# Patient Record
Sex: MALE | Race: WHITE | Employment: OTHER | URBAN - METROPOLITAN AREA
[De-identification: names, ages, dates, MRNs, and addresses within clinical notes are randomized per-mention and may not be internally consistent; named-entity substitution may affect disease eponyms.]

---

## 2017-01-05 ENCOUNTER — TELEPHONE (OUTPATIENT)
Dept: INTERNAL MEDICINE CLINIC | Age: 72
End: 2017-01-05

## 2017-01-11 ENCOUNTER — HOSPITAL ENCOUNTER (OUTPATIENT)
Dept: VASCULAR LAB | Age: 72
Discharge: HOME | End: 2017-01-11
Attending: INTERNAL MEDICINE | Admitting: INTERNAL MEDICINE

## 2017-01-11 DIAGNOSIS — I65.23 CAROTID STENOSIS, ASYMPTOMATIC, BILATERAL: Primary | ICD-10-CM

## 2017-01-11 DIAGNOSIS — E11.40 TYPE 2 DIABETES MELLITUS WITH DIABETIC NEUROPATHY (HCC): ICD-10-CM

## 2017-03-03 LAB — DIABETIC RETINOPATHY: NORMAL

## 2017-04-27 ENCOUNTER — OFFICE VISIT (OUTPATIENT)
Dept: INTERNAL MEDICINE CLINIC | Age: 72
End: 2017-04-27

## 2017-04-27 VITALS
WEIGHT: 196 LBS | DIASTOLIC BLOOD PRESSURE: 60 MMHG | HEART RATE: 72 BPM | BODY MASS INDEX: 28.06 KG/M2 | RESPIRATION RATE: 16 BRPM | HEIGHT: 70 IN | SYSTOLIC BLOOD PRESSURE: 120 MMHG

## 2017-04-27 DIAGNOSIS — E11.42 DIABETIC PERIPHERAL NEUROPATHY (HCC): ICD-10-CM

## 2017-04-27 DIAGNOSIS — E78.5 HYPERLIPIDEMIA, UNSPECIFIED HYPERLIPIDEMIA TYPE: ICD-10-CM

## 2017-04-27 DIAGNOSIS — E11.59 TYPE 2 DIABETES MELLITUS WITH VASCULAR DISEASE (HCC): ICD-10-CM

## 2017-04-27 DIAGNOSIS — I10 BENIGN ESSENTIAL HTN: ICD-10-CM

## 2017-04-27 DIAGNOSIS — Z23 NEED FOR TDAP VACCINATION: ICD-10-CM

## 2017-04-27 DIAGNOSIS — H11.31 SUBCONJUNCTIVAL HEMATOMA, RIGHT: ICD-10-CM

## 2017-04-27 DIAGNOSIS — I73.9 PVD (PERIPHERAL VASCULAR DISEASE) (HCC): ICD-10-CM

## 2017-04-27 DIAGNOSIS — I65.23 CAROTID STENOSIS, ASYMPTOMATIC, BILATERAL: ICD-10-CM

## 2017-04-27 PROCEDURE — G8598 ASA/ANTIPLAT THER USED: HCPCS | Performed by: INTERNAL MEDICINE

## 2017-04-27 PROCEDURE — G8427 DOCREV CUR MEDS BY ELIG CLIN: HCPCS | Performed by: INTERNAL MEDICINE

## 2017-04-27 PROCEDURE — 1123F ACP DISCUSS/DSCN MKR DOCD: CPT | Performed by: INTERNAL MEDICINE

## 2017-04-27 PROCEDURE — G8420 CALC BMI NORM PARAMETERS: HCPCS | Performed by: INTERNAL MEDICINE

## 2017-04-27 PROCEDURE — 1036F TOBACCO NON-USER: CPT | Performed by: INTERNAL MEDICINE

## 2017-04-27 PROCEDURE — 99214 OFFICE O/P EST MOD 30 MIN: CPT | Performed by: INTERNAL MEDICINE

## 2017-04-27 PROCEDURE — 3045F PR MOST RECENT HEMOGLOBIN A1C LEVEL 7.0-9.0%: CPT | Performed by: INTERNAL MEDICINE

## 2017-04-27 PROCEDURE — 4040F PNEUMOC VAC/ADMIN/RCVD: CPT | Performed by: INTERNAL MEDICINE

## 2017-04-27 PROCEDURE — 3017F COLORECTAL CA SCREEN DOC REV: CPT | Performed by: INTERNAL MEDICINE

## 2017-06-20 LAB
ALBUMIN SERPL-MCNC: 3.8 G/DL
ALP BLD-CCNC: 70 U/L
ALT SERPL-CCNC: 8 U/L
AST SERPL-CCNC: 13 U/L
BILIRUB SERPL-MCNC: 0.4 MG/DL (ref 0.1–1.4)
BUN BLDV-MCNC: 15 MG/DL
CALCIUM SERPL-MCNC: 9.9 MG/DL
CHLORIDE BLD-SCNC: 105 MMOL/L
CHOLESTEROL, TOTAL: 142 MG/DL
CHOLESTEROL/HDL RATIO: NORMAL
CO2: NORMAL MMOL/L
CREAT SERPL-MCNC: 0.82 MG/DL
GFR CALCULATED: NORMAL
GLUCOSE BLD-MCNC: NORMAL MG/DL
HDLC SERPL-MCNC: 54 MG/DL (ref 35–70)
LDL CHOLESTEROL CALCULATED: 70 MG/DL (ref 0–160)
POTASSIUM SERPL-SCNC: 4.5 MMOL/L
SODIUM BLD-SCNC: 143 MMOL/L
TOTAL PROTEIN: 6.2
TRIGL SERPL-MCNC: 88 MG/DL
VLDLC SERPL CALC-MCNC: 18 MG/DL

## 2017-06-22 RX ORDER — PIOGLITAZONEHYDROCHLORIDE 30 MG/1
TABLET ORAL
Qty: 90 TABLET | Refills: 3 | Status: SHIPPED | OUTPATIENT
Start: 2017-06-22 | End: 2018-07-20 | Stop reason: SDUPTHER

## 2017-07-10 RX ORDER — TAMSULOSIN HYDROCHLORIDE 0.4 MG/1
CAPSULE ORAL
Qty: 90 CAPSULE | Refills: 3 | Status: SHIPPED | OUTPATIENT
Start: 2017-07-10 | End: 2018-08-01 | Stop reason: SDUPTHER

## 2017-08-30 ENCOUNTER — OFFICE VISIT (OUTPATIENT)
Dept: INTERNAL MEDICINE CLINIC | Age: 72
End: 2017-08-30

## 2017-08-30 VITALS
HEIGHT: 68 IN | DIASTOLIC BLOOD PRESSURE: 68 MMHG | WEIGHT: 198 LBS | TEMPERATURE: 97 F | SYSTOLIC BLOOD PRESSURE: 112 MMHG | BODY MASS INDEX: 30.01 KG/M2

## 2017-08-30 DIAGNOSIS — E11.59 TYPE 2 DIABETES MELLITUS WITH VASCULAR DISEASE (HCC): ICD-10-CM

## 2017-08-30 DIAGNOSIS — I65.23 CAROTID STENOSIS, ASYMPTOMATIC, BILATERAL: ICD-10-CM

## 2017-08-30 DIAGNOSIS — I25.10 CAD IN NATIVE ARTERY: ICD-10-CM

## 2017-08-30 DIAGNOSIS — E11.42 DIABETIC PERIPHERAL NEUROPATHY (HCC): ICD-10-CM

## 2017-08-30 DIAGNOSIS — E78.5 HYPERLIPIDEMIA, UNSPECIFIED HYPERLIPIDEMIA TYPE: ICD-10-CM

## 2017-08-30 DIAGNOSIS — I10 BENIGN ESSENTIAL HTN: ICD-10-CM

## 2017-08-30 PROCEDURE — G8417 CALC BMI ABV UP PARAM F/U: HCPCS | Performed by: INTERNAL MEDICINE

## 2017-08-30 PROCEDURE — 4040F PNEUMOC VAC/ADMIN/RCVD: CPT | Performed by: INTERNAL MEDICINE

## 2017-08-30 PROCEDURE — 1123F ACP DISCUSS/DSCN MKR DOCD: CPT | Performed by: INTERNAL MEDICINE

## 2017-08-30 PROCEDURE — 3046F HEMOGLOBIN A1C LEVEL >9.0%: CPT | Performed by: INTERNAL MEDICINE

## 2017-08-30 PROCEDURE — 3017F COLORECTAL CA SCREEN DOC REV: CPT | Performed by: INTERNAL MEDICINE

## 2017-08-30 PROCEDURE — 1036F TOBACCO NON-USER: CPT | Performed by: INTERNAL MEDICINE

## 2017-08-30 PROCEDURE — G8427 DOCREV CUR MEDS BY ELIG CLIN: HCPCS | Performed by: INTERNAL MEDICINE

## 2017-08-30 PROCEDURE — G8598 ASA/ANTIPLAT THER USED: HCPCS | Performed by: INTERNAL MEDICINE

## 2017-08-30 PROCEDURE — 99214 OFFICE O/P EST MOD 30 MIN: CPT | Performed by: INTERNAL MEDICINE

## 2017-09-06 ENCOUNTER — HOSPITAL ENCOUNTER (OUTPATIENT)
Dept: VASCULAR LAB | Age: 72
Discharge: HOME | End: 2017-09-06
Attending: INTERNAL MEDICINE | Admitting: INTERNAL MEDICINE

## 2017-09-06 DIAGNOSIS — I65.23 OCCLUSION AND STENOSIS OF BILATERAL CAROTID ARTERIES: ICD-10-CM

## 2018-01-03 ENCOUNTER — OFFICE VISIT (OUTPATIENT)
Dept: INTERNAL MEDICINE CLINIC | Age: 73
End: 2018-01-03

## 2018-01-03 VITALS
DIASTOLIC BLOOD PRESSURE: 50 MMHG | SYSTOLIC BLOOD PRESSURE: 120 MMHG | BODY MASS INDEX: 28.49 KG/M2 | HEIGHT: 70 IN | WEIGHT: 199 LBS | HEART RATE: 68 BPM | RESPIRATION RATE: 12 BRPM

## 2018-01-03 DIAGNOSIS — I25.10 CAD IN NATIVE ARTERY: ICD-10-CM

## 2018-01-03 DIAGNOSIS — Z23 NEED FOR PNEUMOCOCCAL VACCINATION: ICD-10-CM

## 2018-01-03 DIAGNOSIS — I10 BENIGN ESSENTIAL HTN: ICD-10-CM

## 2018-01-03 DIAGNOSIS — E11.40 TYPE 2 DIABETES, CONTROLLED, WITH NEUROPATHY (HCC): Primary | ICD-10-CM

## 2018-01-03 DIAGNOSIS — E11.42 DIABETIC PERIPHERAL NEUROPATHY (HCC): ICD-10-CM

## 2018-01-03 DIAGNOSIS — I73.9 PVD (PERIPHERAL VASCULAR DISEASE) (HCC): ICD-10-CM

## 2018-01-03 DIAGNOSIS — Z12.5 SCREENING FOR PROSTATE CANCER: ICD-10-CM

## 2018-01-03 DIAGNOSIS — E78.5 HYPERLIPIDEMIA, UNSPECIFIED HYPERLIPIDEMIA TYPE: ICD-10-CM

## 2018-01-03 DIAGNOSIS — E11.59 TYPE 2 DIABETES MELLITUS WITH VASCULAR DISEASE (HCC): ICD-10-CM

## 2018-01-03 PROCEDURE — G8427 DOCREV CUR MEDS BY ELIG CLIN: HCPCS | Performed by: INTERNAL MEDICINE

## 2018-01-03 PROCEDURE — G8484 FLU IMMUNIZE NO ADMIN: HCPCS | Performed by: INTERNAL MEDICINE

## 2018-01-03 PROCEDURE — 1123F ACP DISCUSS/DSCN MKR DOCD: CPT | Performed by: INTERNAL MEDICINE

## 2018-01-03 PROCEDURE — 90732 PPSV23 VACC 2 YRS+ SUBQ/IM: CPT | Performed by: INTERNAL MEDICINE

## 2018-01-03 PROCEDURE — 4040F PNEUMOC VAC/ADMIN/RCVD: CPT | Performed by: INTERNAL MEDICINE

## 2018-01-03 PROCEDURE — G8417 CALC BMI ABV UP PARAM F/U: HCPCS | Performed by: INTERNAL MEDICINE

## 2018-01-03 PROCEDURE — G0009 ADMIN PNEUMOCOCCAL VACCINE: HCPCS | Performed by: INTERNAL MEDICINE

## 2018-01-03 PROCEDURE — 3046F HEMOGLOBIN A1C LEVEL >9.0%: CPT | Performed by: INTERNAL MEDICINE

## 2018-01-03 PROCEDURE — G8598 ASA/ANTIPLAT THER USED: HCPCS | Performed by: INTERNAL MEDICINE

## 2018-01-03 PROCEDURE — 3017F COLORECTAL CA SCREEN DOC REV: CPT | Performed by: INTERNAL MEDICINE

## 2018-01-03 PROCEDURE — 1036F TOBACCO NON-USER: CPT | Performed by: INTERNAL MEDICINE

## 2018-01-03 PROCEDURE — 99214 OFFICE O/P EST MOD 30 MIN: CPT | Performed by: INTERNAL MEDICINE

## 2018-01-03 ASSESSMENT — PATIENT HEALTH QUESTIONNAIRE - PHQ9
SUM OF ALL RESPONSES TO PHQ9 QUESTIONS 1 & 2: 0
1. LITTLE INTEREST OR PLEASURE IN DOING THINGS: 0
SUM OF ALL RESPONSES TO PHQ QUESTIONS 1-9: 0
2. FEELING DOWN, DEPRESSED OR HOPELESS: 0

## 2018-01-03 NOTE — PATIENT INSTRUCTIONS
Patient Education        Pneumococcal Polysaccharide Vaccine: What You Need to Know  Why get vaccinated? Vaccination can protect older adults (and some children and younger adults) from pneumococcal disease. Pneumococcal disease is caused by bacteria that can spread from person to person through close contact. It can cause ear infections, and it can also lead to more serious infections of the:  · Lungs (pneumonia),  · Blood (bacteremia), and  · Covering of the brain and spinal cord (meningitis). Meningitis can cause deafness and brain damage, and it can be fatal.  Anyone can get pneumococcal disease, but children under 3years of age, people with certain medical conditions, adults over 72years of age, and cigarette smokers are at the highest risk. About 18,000 older adults die each year from pneumococcal disease in the [place]. Treatment of pneumococcal infections with penicillin and other drugs used to be more effective. But some strains of the disease have become resistant to these drugs. This makes prevention of the disease, through vaccination, even more important. Pneumococcal polysaccharide vaccine (PPSV23)  Pneumococcal polysaccharide vaccine (PPSV23) protects against 23 types of pneumococcal bacteria. It will not prevent all pneumococcal disease. PPSV23 is recommended for:  · All adults 72years of age and older,  · Anyone 2 through 59years of age with certain long-term health problems,  · Anyone 2 through 59years of age with a weakened immune system,  · Adults 23 through 59years of age who smoke cigarettes or have asthma. Most people need only one dose of PPSV. A second dose is recommended for certain high-risk groups. People 72 and older should get a dose even if they have gotten one or more doses of the vaccine before they turned 65. Your healthcare provider can give you more information about these recommendations.   Most healthy adults develop protection within 2 to 3 weeks of getting information. Patient Education        Diabetic Neuropathy: Care Instructions  Your Care Instructions    When you have diabetes, your blood sugar level may get too high. Over time, high blood sugar levels can damage nerves. This is called diabetic neuropathy. Nerve damage can cause pain, burning, tingling, and numbness and may leave you feeling weak. The feet are often affected. When you have nerve damage in your feet, you cannot feel your feet and toes as well as normal and may not notice cuts or sores. Even a small injury can lead to a serious infection. It is very important that you follow your doctor's advice on foot care. Sometimes diabetes damages nerves that help the body function. If this happens, your blood pressure, sweating, digestion, and urination might be affected. Your doctor may give you a target blood sugar level that is higher or lower than you are used to. Try to keep your blood sugar very close to this target level to prevent more damage. Follow-up care is a key part of your treatment and safety. Be sure to make and go to all appointments, and call your doctor if you are having problems. It's also a good idea to know your test results and keep a list of the medicines you take. How can you care for yourself at home? · Take your medicines exactly as prescribed. Call your doctor if you think you are having a problem with your medicine. It is very important that you take your insulin or diabetes pills as your doctor tells you. · Try to keep blood sugar at your target level. ¨ Eat a variety of healthy foods, with carbohydrate spread out in your meals. A dietitian can help you plan meals. ¨ Try to get at least 30 minutes of exercise on most days. ¨ Check your blood sugar as many times each day as your doctor recommends. · Take and record your blood pressure at home if your doctor tells you to. Learn the importance of the two measures of blood pressure (such as 130 over 80, or 130/80).  To take your blood pressure at home:  ¨ Ask your doctor to check your blood pressure monitor to be sure it is accurate and the cuff fits you. Also ask your doctor to watch you to make sure that you are using it right. ¨ Do not use medicine known to raise blood pressure (such as some nasal decongestant sprays) before taking your blood pressure. ¨ Avoid taking your blood pressure if you have just exercised or are nervous or upset. Rest at least 15 minutes before you take a reading. · Take pain medicines exactly as directed. ¨ If the doctor gave you a prescription medicine for pain, take it as prescribed. ¨ If you are not taking a prescription pain medicine, ask your doctor if you can take an over-the-counter medicine. · Do not smoke. Smoking can increase your chance for a heart attack or stroke. If you need help quitting, talk to your doctor about stop-smoking programs and medicines. These can increase your chances of quitting for good. · Limit alcohol to 2 drinks a day for men and 1 drink a day for women. Too much alcohol can cause health problems. · Eat small meals often, rather than 2 or 3 large meals a day. To care for your feet  · Prevent injury by wearing shoes at all times, even when you are indoors. · Do foot care as part of your daily routine. Wash your feet and then rub lotion on your feet, but not between your toes. Use a handheld mirror or magnifying mirror to inspect your feet for blisters, cuts, cracks, or sores. · Have your toenails trimmed and filed straight across. · Wear shoes and socks that fit well. Soft shoes that have good support and that fit well (such as tennis shoes) are best for your feet. · Check your shoes for any loose objects or rough edges before you put them on. · Ask your doctor to check your feet during each visit. Your doctor may notice a foot problem you have missed. · Get early treatment for any foot problem, even a minor one. When should you call for help?   Call your doctor now or seek immediate medical care if:  ? · You have symptoms of infection, such as:  ¨ Increased pain, swelling, warmth, or redness. ¨ Red streaks leading from the area. ¨ Pus draining from the area. ¨ A fever. ? · You have new or worse numbness, pain, or tingling in any part of your body. ? Watch closely for changes in your health, and be sure to contact your doctor if:  ? · You have a new problem with your feet, such as:  ¨ A new sore or ulcer. ¨ A break in the skin that is not healing after several days. ¨ Bleeding corns or calluses. ¨ An ingrown toenail. ? · You do not get better as expected. Where can you learn more? Go to https://OneRiot.[employer]. org and sign in to your [employer] account. Enter F833 in the Sansan box to learn more about \"Diabetic Neuropathy: Care Instructions. \"     If you do not have an account, please click on the \"Sign Up Now\" link. Current as of: March 13, 2017  Content Version: 11.4  © 0647-8415 [employer]. Care instructions adapted under license by [facility] ([facility]). If you have questions about a medical condition or this instruction, always ask your healthcare professional. [address] any warranty or liability for your use of this information. Patient Education        Diabetes Foot Health: Care Instructions  Your Care Instructions    When you have diabetes, your feet need extra care and attention. Diabetes can damage the nerve endings and blood vessels in your feet, making you less likely to notice when your feet are injured. Diabetes also limits your body's ability to fight infection and get blood to areas that need it. If you get a minor foot injury, it could become an ulcer or a serious infection. With good foot care, you can prevent most of these problems. Caring for your feet can be quick and easy. Most of the care can be done when you are bathing or getting ready for bed.   Follow-up care is a catalogs. · Look inside your shoes every day for things like gravel or torn linings, which could cause blisters or sores. · Buy shoes that fit well:  ¨ Look for shoes that have plenty of space around the toes. This helps prevent bunions and blisters. ¨ Try on shoes while wearing the kind of socks you will usually wear with the shoes. ¨ Avoid plastic shoes. They may rub your feet and cause blisters. Good shoes should be made of materials that are flexible and breathable, such as leather or cloth. ¨ Break in new shoes slowly by wearing them for no more than an hour a day for several days. Take extra time to check your feet for red areas, blisters, or other problems after you wear new shoes. · Do not go barefoot. Do not wear sandals, and do not wear shoes with very thin soles. Thin soles are easy to puncture. They also do not protect your feet from hot pavement or cold weather. · Have your doctor check your feet during each visit. If you have a foot problem, see your doctor. Do not try to treat an early foot problem at home. Home remedies or treatments that you can buy without a prescription (such as corn removers) can be harmful. · Always get early treatment for foot problems. A minor irritation can lead to a major problem if not properly cared for early. When should you call for help? Call your doctor now or seek immediate medical care if:  ? · You have a foot sore, an ulcer or break in the skin that is not healing after 4 days, bleeding corns or calluses, or an ingrown toenail. ? · You have blue or black areas, which can mean bruising or blood flow problems. ? · You have peeling skin or tiny blisters between your toes or cracking or oozing of the skin. ? · You have a fever for more than 24 hours and a foot sore. ? · You have new numbness or tingling in your feet that does not go away after you move your feet or change positions. ? · You have unexplained or unusual swelling of the foot or ankle. ?Watch closely for changes in your health, and be sure to contact your doctor if:  ? · You cannot do proper foot care. Where can you learn more? Go to https://chpepiceweb.[email or web address]. org and sign in to your [employer] account. Enter A739 in the Fanattac box to learn more about \"Diabetes Foot Health: Care Instructions. \"     If you do not have an account, please click on the \"Sign Up Now\" link. Current as of: March 13, 2017  Content Version: 11.4  © 6179-1184 [employer]. Care instructions adapted under license by [facility] ([facility]). If you have questions about a medical condition or this instruction, always ask your healthcare professional. [address] any warranty or liability for your use of this information.

## 2018-01-08 RX ORDER — LISINOPRIL 20 MG/1
TABLET ORAL
Qty: 90 TABLET | Refills: 3 | Status: SHIPPED | OUTPATIENT
Start: 2018-01-08 | End: 2019-04-01 | Stop reason: SDUPTHER

## 2018-04-25 LAB
ALBUMIN SERPL-MCNC: 4 G/DL
ALP BLD-CCNC: 68 U/L
ALT SERPL-CCNC: 15 U/L
ANION GAP SERPL CALCULATED.3IONS-SCNC: NORMAL MMOL/L
AST SERPL-CCNC: 25 U/L
AVERAGE GLUCOSE: NORMAL
BILIRUB SERPL-MCNC: 0.5 MG/DL (ref 0.1–1.4)
BUN BLDV-MCNC: 12 MG/DL
CALCIUM SERPL-MCNC: 10.2 MG/DL
CHLORIDE BLD-SCNC: 101 MMOL/L
CHOLESTEROL, TOTAL: 143 MG/DL
CHOLESTEROL/HDL RATIO: NORMAL
CO2: 26 MMOL/L
CREAT SERPL-MCNC: 0.76 MG/DL
GFR CALCULATED: NORMAL
GLUCOSE BLD-MCNC: 134 MG/DL
HBA1C MFR BLD: 7 %
HDLC SERPL-MCNC: 58 MG/DL (ref 35–70)
LDL CHOLESTEROL CALCULATED: 70 MG/DL (ref 0–160)
POTASSIUM SERPL-SCNC: 4.5 MMOL/L
PROSTATE SPECIFIC ANTIGEN FREE: NORMAL NG/ML
PROSTATE SPECIFIC ANTIGEN PERCENT FREE: NORMAL %
PSA-PROSTATE SPECIFIC AG: 0.4
SODIUM BLD-SCNC: 138 MMOL/L
TOTAL PROTEIN: 6.7
TRIGL SERPL-MCNC: 74 MG/DL
VLDLC SERPL CALC-MCNC: 15 MG/DL

## 2018-05-09 ENCOUNTER — OFFICE VISIT (OUTPATIENT)
Dept: INTERNAL MEDICINE CLINIC | Age: 73
End: 2018-05-09

## 2018-05-09 VITALS
HEART RATE: 68 BPM | DIASTOLIC BLOOD PRESSURE: 50 MMHG | SYSTOLIC BLOOD PRESSURE: 115 MMHG | RESPIRATION RATE: 12 BRPM | BODY MASS INDEX: 28.63 KG/M2 | HEIGHT: 70 IN | WEIGHT: 200 LBS

## 2018-05-09 DIAGNOSIS — E11.40 TYPE 2 DIABETES, CONTROLLED, WITH NEUROPATHY (HCC): ICD-10-CM

## 2018-05-09 DIAGNOSIS — I25.10 CAD IN NATIVE ARTERY: ICD-10-CM

## 2018-05-09 DIAGNOSIS — E11.42 DIABETIC PERIPHERAL NEUROPATHY (HCC): ICD-10-CM

## 2018-05-09 DIAGNOSIS — I65.23 ASYMPTOMATIC BILATERAL CAROTID ARTERY STENOSIS: ICD-10-CM

## 2018-05-09 DIAGNOSIS — I73.9 PVD (PERIPHERAL VASCULAR DISEASE) (HCC): ICD-10-CM

## 2018-05-09 DIAGNOSIS — Z23 NEED FOR SHINGLES VACCINE: ICD-10-CM

## 2018-05-09 DIAGNOSIS — E11.59 TYPE 2 DIABETES MELLITUS WITH VASCULAR DISEASE (HCC): ICD-10-CM

## 2018-05-09 DIAGNOSIS — I10 BENIGN ESSENTIAL HTN: ICD-10-CM

## 2018-05-09 DIAGNOSIS — E78.5 HYPERLIPIDEMIA, UNSPECIFIED HYPERLIPIDEMIA TYPE: ICD-10-CM

## 2018-05-09 PROCEDURE — 3017F COLORECTAL CA SCREEN DOC REV: CPT | Performed by: INTERNAL MEDICINE

## 2018-05-09 PROCEDURE — 1036F TOBACCO NON-USER: CPT | Performed by: INTERNAL MEDICINE

## 2018-05-09 PROCEDURE — G8598 ASA/ANTIPLAT THER USED: HCPCS | Performed by: INTERNAL MEDICINE

## 2018-05-09 PROCEDURE — G8428 CUR MEDS NOT DOCUMENT: HCPCS | Performed by: INTERNAL MEDICINE

## 2018-05-09 PROCEDURE — 1123F ACP DISCUSS/DSCN MKR DOCD: CPT | Performed by: INTERNAL MEDICINE

## 2018-05-09 PROCEDURE — 3045F PR MOST RECENT HEMOGLOBIN A1C LEVEL 7.0-9.0%: CPT | Performed by: INTERNAL MEDICINE

## 2018-05-09 PROCEDURE — 2022F DILAT RTA XM EVC RTNOPTHY: CPT | Performed by: INTERNAL MEDICINE

## 2018-05-09 PROCEDURE — G8417 CALC BMI ABV UP PARAM F/U: HCPCS | Performed by: INTERNAL MEDICINE

## 2018-05-09 PROCEDURE — 4040F PNEUMOC VAC/ADMIN/RCVD: CPT | Performed by: INTERNAL MEDICINE

## 2018-05-09 PROCEDURE — 99214 OFFICE O/P EST MOD 30 MIN: CPT | Performed by: INTERNAL MEDICINE

## 2018-07-20 RX ORDER — PIOGLITAZONEHYDROCHLORIDE 30 MG/1
TABLET ORAL
Qty: 90 TABLET | Refills: 3 | Status: SHIPPED | OUTPATIENT
Start: 2018-07-20 | End: 2019-07-10 | Stop reason: SDUPTHER

## 2018-07-31 ENCOUNTER — NURSE ONLY (OUTPATIENT)
Dept: INTERNAL MEDICINE CLINIC | Age: 73
End: 2018-07-31

## 2018-07-31 DIAGNOSIS — Z12.12 SCREENING FOR MALIGNANT NEOPLASM OF THE RECTUM: Primary | ICD-10-CM

## 2018-08-01 LAB
CONTROL: NORMAL
HEMOCCULT STL QL: NORMAL

## 2018-08-01 PROCEDURE — 82274 ASSAY TEST FOR BLOOD FECAL: CPT | Performed by: INTERNAL MEDICINE

## 2018-08-01 RX ORDER — TAMSULOSIN HYDROCHLORIDE 0.4 MG/1
CAPSULE ORAL
Qty: 90 CAPSULE | Refills: 3 | Status: SHIPPED | OUTPATIENT
Start: 2018-08-01 | End: 2019-07-28 | Stop reason: SDUPTHER

## 2018-09-26 ENCOUNTER — OFFICE VISIT (OUTPATIENT)
Dept: INTERNAL MEDICINE CLINIC | Age: 73
End: 2018-09-26
Payer: MEDICARE

## 2018-09-26 VITALS
HEART RATE: 64 BPM | HEIGHT: 68 IN | SYSTOLIC BLOOD PRESSURE: 115 MMHG | WEIGHT: 201 LBS | DIASTOLIC BLOOD PRESSURE: 46 MMHG | BODY MASS INDEX: 30.46 KG/M2 | RESPIRATION RATE: 16 BRPM

## 2018-09-26 DIAGNOSIS — I73.9 PVD (PERIPHERAL VASCULAR DISEASE) (HCC): ICD-10-CM

## 2018-09-26 DIAGNOSIS — E11.59 TYPE 2 DIABETES MELLITUS WITH VASCULAR DISEASE (HCC): ICD-10-CM

## 2018-09-26 DIAGNOSIS — I25.10 CAD IN NATIVE ARTERY: ICD-10-CM

## 2018-09-26 DIAGNOSIS — I10 BENIGN ESSENTIAL HTN: ICD-10-CM

## 2018-09-26 DIAGNOSIS — E11.42 DIABETIC PERIPHERAL NEUROPATHY (HCC): ICD-10-CM

## 2018-09-26 DIAGNOSIS — E78.5 HYPERLIPIDEMIA, UNSPECIFIED HYPERLIPIDEMIA TYPE: ICD-10-CM

## 2018-09-26 DIAGNOSIS — E11.40 TYPE 2 DIABETES, CONTROLLED, WITH NEUROPATHY (HCC): ICD-10-CM

## 2018-09-26 DIAGNOSIS — Z23 NEED FOR SHINGLES VACCINE: ICD-10-CM

## 2018-09-26 LAB
A/G RATIO: 1.3 (ref 1.1–2.2)
ALBUMIN SERPL-MCNC: 4 G/DL (ref 3.4–5)
ALP BLD-CCNC: 87 U/L (ref 40–129)
ALT SERPL-CCNC: 11 U/L (ref 10–40)
ANION GAP SERPL CALCULATED.3IONS-SCNC: 12 MMOL/L (ref 3–16)
AST SERPL-CCNC: 16 U/L (ref 15–37)
BILIRUB SERPL-MCNC: 0.3 MG/DL (ref 0–1)
BUN BLDV-MCNC: 14 MG/DL (ref 7–20)
CALCIUM SERPL-MCNC: 10.1 MG/DL (ref 8.3–10.6)
CHLORIDE BLD-SCNC: 102 MMOL/L (ref 99–110)
CHOLESTEROL, TOTAL: 147 MG/DL (ref 0–199)
CO2: 25 MMOL/L (ref 21–32)
CREAT SERPL-MCNC: 0.6 MG/DL (ref 0.8–1.3)
ESTIMATED AVERAGE GLUCOSE: 165.7 MG/DL
GFR AFRICAN AMERICAN: >60
GFR NON-AFRICAN AMERICAN: >60
GLOBULIN: 3 G/DL
GLUCOSE BLD-MCNC: 180 MG/DL (ref 70–99)
HBA1C MFR BLD: 7.4 %
HDLC SERPL-MCNC: 60 MG/DL (ref 40–60)
LDL CHOLESTEROL CALCULATED: 73 MG/DL
POTASSIUM SERPL-SCNC: 4.7 MMOL/L (ref 3.5–5.1)
SODIUM BLD-SCNC: 139 MMOL/L (ref 136–145)
TOTAL PROTEIN: 7 G/DL (ref 6.4–8.2)
TRIGL SERPL-MCNC: 70 MG/DL (ref 0–150)
TSH SERPL DL<=0.05 MIU/L-ACNC: 3.29 UIU/ML (ref 0.27–4.2)
VLDLC SERPL CALC-MCNC: 14 MG/DL

## 2018-09-26 PROCEDURE — 1036F TOBACCO NON-USER: CPT | Performed by: INTERNAL MEDICINE

## 2018-09-26 PROCEDURE — G8598 ASA/ANTIPLAT THER USED: HCPCS | Performed by: INTERNAL MEDICINE

## 2018-09-26 PROCEDURE — 3045F PR MOST RECENT HEMOGLOBIN A1C LEVEL 7.0-9.0%: CPT | Performed by: INTERNAL MEDICINE

## 2018-09-26 PROCEDURE — 4040F PNEUMOC VAC/ADMIN/RCVD: CPT | Performed by: INTERNAL MEDICINE

## 2018-09-26 PROCEDURE — 99214 OFFICE O/P EST MOD 30 MIN: CPT | Performed by: INTERNAL MEDICINE

## 2018-09-26 PROCEDURE — G8417 CALC BMI ABV UP PARAM F/U: HCPCS | Performed by: INTERNAL MEDICINE

## 2018-09-26 PROCEDURE — G8428 CUR MEDS NOT DOCUMENT: HCPCS | Performed by: INTERNAL MEDICINE

## 2018-09-26 PROCEDURE — 1101F PT FALLS ASSESS-DOCD LE1/YR: CPT | Performed by: INTERNAL MEDICINE

## 2018-09-26 PROCEDURE — 1123F ACP DISCUSS/DSCN MKR DOCD: CPT | Performed by: INTERNAL MEDICINE

## 2018-09-26 PROCEDURE — 3017F COLORECTAL CA SCREEN DOC REV: CPT | Performed by: INTERNAL MEDICINE

## 2018-09-26 PROCEDURE — 2022F DILAT RTA XM EVC RTNOPTHY: CPT | Performed by: INTERNAL MEDICINE

## 2018-09-26 NOTE — PROGRESS NOTES
[address] Internal Medicine  Patient Encounter  Dr. Christiano Garcia    Chief Complaint   Patient presents with   William Qureshi       HPI: 68 y.o. male seen today regarding the status of his current chronic medical problems below and medication review. Diabetes Mellitus Type II, Follow-up--   Lab Results   Component Value Date    LABA1C 7.0 04/25/2018     Nothing has changed: he does not check his sugars and he does not adhere to lifestyle changes. He does not adhere to carb restricted diet. He eats a lot of sweets. He denies new dysesthesias in the feet. He does walk 2-3 times per week. Last Eye Exam-- 3/3/2017, Overdue  U.Microalbumin/Cr-- 11/30/2017  Pt is on ACEI --Lisinopril. +ASA 81 mg daily   Complications--  Neuropathy, PVD. No tobacco use  LDL--70  + Statin    HTN-- He denies concerns with regards to his BP. No checks at home. He is compliant with his medication regimen includes Lisinopril 20 mg daily, Lopressor 25 mg twice a day. He denies dizziness, swelling, syncope, HA's. He endorses no symptoms suggestive of TIA or stroke. Hyperlipidemia:    Lab Results   Component Value Date    LDLCALC 70 04/25/2018     He continues on simvastatin 40 mg nightly. He denies new myalgias or weakness. He does not follow a low fat diet. 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 Angiogram 4/29/2013. He sees Dr. Ebony Ferrell. He was just seen in June. Note reviewed. He denies CP at rest or with exertion. He denies ARMENTA, swelling, orthopnea. Carotid stenosis-- Previous history---->  Last doppler 9/6/2017: <50% BL. He is asymptomatic. PVD--  He denies any claudication with exertion or at rest.  This was seen on imaging. No skin changes on feet.        Past Medical History:   Diagnosis Date    CAD in native artery     Carotid stenoses 4/29/2013    20-49% BL    Cataracts, bilateral     Hyperlipidemia     Hypertension     PVD (peripheral vascular disease) ([facility] [address]     Type II or unspecified type diabetes mellitus without mention of complication, not stated as uncontrolled        Review of Systems   As per HPI      OBJECTIVE:  BP (!) 115/46   Pulse 64   Resp 16   Ht 5' 8\" (1.727 m)   Wt 201 lb (91.2 kg)   BMI 30.56 kg/m²     Wt Readings from Last 3 Encounters:   09/26/18 201 lb (91.2 kg)   05/09/18 200 lb (90.7 kg)   01/03/18 199 lb (90.3 kg)     BP Readings from Last 3 Encounters:   09/26/18 (!) 115/46   05/09/18 (!) 115/50   01/03/18 (!) 120/50       GEN: NAD, A&O, Overweight. HEENT:  NC/AT, CYN, EOMI, TM's NL, Anicteric. Oral cavity is clear without mucosal lesions. NECK: Supple  No thyromegaly. No soft tissue masses. No JVD. Trachea midline. LYMPH: No C/SC/A nodes  CV: Rhythm is regular. Rate normal.  No ectopy. No murmurs. PULM: CTA  EXT: No pitting edema  GI: Abd. Soft, NT, No masses. No hepatomegaly. NEURO:  No focal or lateralizing deficits. Monofilament testing intact both feet. Vibratory sensation suprisingly intact  SKIN:  No concerning rashes  VASC: No carotid bruits. Pedal pulses are palpable. ASSESSMENT/PLAN:    1. Type 2 diabetes, uncontrolled, with neuropathy (HCC)  Condition stability is uncertain. Due for lab  Patient has lab orders in the computer  Counseled patient on the need to participate in lifestyle modification. Patient has really been resistant to engaging in lifestyle modifications. He eats what he wants and dissipates and very little exercise  Diabetic retinal eye exam is overdue. Patient encouraged to get this done  Counseled patient again on regular foot exams  Will check urinalysis next visit to monitor for hematuria while on pioglitazone    2. Type 2 diabetes mellitus with vascular disease ([address]  As above    3. Need for shingles vaccine    - zoster recombinant adjuvanted vaccine (SHINGRIX) 50 MCG SUSR injection; Inject 0.5 mLs into the muscle once for 1 dose . Repeat in 2-6 months  Dispense: 0.5 mL; Refill: 1    4.  Benign essential HTN  Blood pressure is well controlled  Continue current medications    5. Hyperlipidemia, unspecified hyperlipidemia type  Condition stability is uncertain. Overdue for lab  Continue simvastatin which she has tolerated well  Observe for the development of adverse side effects. 6. PVD (peripheral vascular disease) ([address]  Condition is stable without signs of claudication  Continue aggressive cardiovascular risk factor management    7. Diabetic peripheral neuropathy (HCC)  Condition is stable without signs of worsening dysesthesias. Actually he has improved sensation  Continue tight diabetic control    8. CAD in native artery  Addition is stable.   He has no signs of cardiac decompensation or fluid overload  Continue Actos with caution  Continue aggressive cardiovascular risk factor management

## 2018-09-28 ENCOUNTER — TELEPHONE (OUTPATIENT)
Dept: INTERNAL MEDICINE CLINIC | Age: 73
End: 2018-09-28

## 2018-09-28 NOTE — TELEPHONE ENCOUNTER
Patient returned call regarding blood test results. Please have Lauren Montemayor call him to discuss results at [facility]. Thanks.

## 2019-01-23 ENCOUNTER — OFFICE VISIT (OUTPATIENT)
Dept: INTERNAL MEDICINE CLINIC | Age: 74
End: 2019-01-23
Payer: MEDICARE

## 2019-01-23 VITALS
HEIGHT: 70 IN | HEART RATE: 58 BPM | WEIGHT: 203 LBS | DIASTOLIC BLOOD PRESSURE: 60 MMHG | BODY MASS INDEX: 29.06 KG/M2 | SYSTOLIC BLOOD PRESSURE: 120 MMHG | RESPIRATION RATE: 12 BRPM

## 2019-01-23 DIAGNOSIS — E11.42 DIABETIC PERIPHERAL NEUROPATHY (HCC): ICD-10-CM

## 2019-01-23 DIAGNOSIS — I65.23 ASYMPTOMATIC BILATERAL CAROTID ARTERY STENOSIS: ICD-10-CM

## 2019-01-23 DIAGNOSIS — I10 BENIGN ESSENTIAL HTN: ICD-10-CM

## 2019-01-23 DIAGNOSIS — E78.5 HYPERLIPIDEMIA, UNSPECIFIED HYPERLIPIDEMIA TYPE: ICD-10-CM

## 2019-01-23 DIAGNOSIS — Z79.899 DRUG THERAPY: ICD-10-CM

## 2019-01-23 DIAGNOSIS — I25.10 CAD IN NATIVE ARTERY: ICD-10-CM

## 2019-01-23 DIAGNOSIS — I73.9 PVD (PERIPHERAL VASCULAR DISEASE) (HCC): ICD-10-CM

## 2019-01-23 DIAGNOSIS — E11.59 TYPE 2 DIABETES MELLITUS WITH VASCULAR DISEASE (HCC): ICD-10-CM

## 2019-01-23 LAB
BILIRUBIN, POC: NORMAL
BLOOD URINE, POC: NORMAL
CLARITY, POC: CLEAR
COLOR, POC: YELLOW
CREATININE URINE: 107.3 MG/DL (ref 39–259)
GLUCOSE URINE, POC: NORMAL
KETONES, POC: NORMAL
LEUKOCYTE EST, POC: NORMAL
MICROALBUMIN UR-MCNC: <1.2 MG/DL
MICROALBUMIN/CREAT UR-RTO: NORMAL MG/G (ref 0–30)
NITRITE, POC: NORMAL
PH, POC: 5
PROTEIN, POC: NORMAL
SPECIFIC GRAVITY, POC: 1.01
UROBILINOGEN, POC: 0.2

## 2019-01-23 PROCEDURE — 1123F ACP DISCUSS/DSCN MKR DOCD: CPT | Performed by: INTERNAL MEDICINE

## 2019-01-23 PROCEDURE — 99214 OFFICE O/P EST MOD 30 MIN: CPT | Performed by: INTERNAL MEDICINE

## 2019-01-23 PROCEDURE — 1101F PT FALLS ASSESS-DOCD LE1/YR: CPT | Performed by: INTERNAL MEDICINE

## 2019-01-23 PROCEDURE — 81002 URINALYSIS NONAUTO W/O SCOPE: CPT | Performed by: INTERNAL MEDICINE

## 2019-01-23 PROCEDURE — G8598 ASA/ANTIPLAT THER USED: HCPCS | Performed by: INTERNAL MEDICINE

## 2019-01-23 PROCEDURE — 2022F DILAT RTA XM EVC RTNOPTHY: CPT | Performed by: INTERNAL MEDICINE

## 2019-01-23 PROCEDURE — G8427 DOCREV CUR MEDS BY ELIG CLIN: HCPCS | Performed by: INTERNAL MEDICINE

## 2019-01-23 PROCEDURE — G8484 FLU IMMUNIZE NO ADMIN: HCPCS | Performed by: INTERNAL MEDICINE

## 2019-01-23 PROCEDURE — 3046F HEMOGLOBIN A1C LEVEL >9.0%: CPT | Performed by: INTERNAL MEDICINE

## 2019-01-23 PROCEDURE — G8417 CALC BMI ABV UP PARAM F/U: HCPCS | Performed by: INTERNAL MEDICINE

## 2019-01-23 PROCEDURE — 1036F TOBACCO NON-USER: CPT | Performed by: INTERNAL MEDICINE

## 2019-01-23 PROCEDURE — 3017F COLORECTAL CA SCREEN DOC REV: CPT | Performed by: INTERNAL MEDICINE

## 2019-01-23 PROCEDURE — 4040F PNEUMOC VAC/ADMIN/RCVD: CPT | Performed by: INTERNAL MEDICINE

## 2019-03-11 ENCOUNTER — OFFICE VISIT (OUTPATIENT)
Dept: ORTHOPEDIC SURGERY | Age: 74
End: 2019-03-11
Payer: MEDICARE

## 2019-03-11 VITALS
HEIGHT: 68 IN | WEIGHT: 200 LBS | BODY MASS INDEX: 30.31 KG/M2 | SYSTOLIC BLOOD PRESSURE: 130 MMHG | HEART RATE: 76 BPM | DIASTOLIC BLOOD PRESSURE: 74 MMHG

## 2019-03-11 DIAGNOSIS — M79.672 LEFT FOOT PAIN: Primary | ICD-10-CM

## 2019-03-11 DIAGNOSIS — M10.9 GOUTY ARTHRITIS OF LEFT GREAT TOE: ICD-10-CM

## 2019-03-11 PROCEDURE — 4040F PNEUMOC VAC/ADMIN/RCVD: CPT | Performed by: PHYSICIAN ASSISTANT

## 2019-03-11 PROCEDURE — 3017F COLORECTAL CA SCREEN DOC REV: CPT | Performed by: PHYSICIAN ASSISTANT

## 2019-03-11 PROCEDURE — G8417 CALC BMI ABV UP PARAM F/U: HCPCS | Performed by: PHYSICIAN ASSISTANT

## 2019-03-11 PROCEDURE — 1101F PT FALLS ASSESS-DOCD LE1/YR: CPT | Performed by: PHYSICIAN ASSISTANT

## 2019-03-11 PROCEDURE — 99203 OFFICE O/P NEW LOW 30 MIN: CPT | Performed by: PHYSICIAN ASSISTANT

## 2019-03-11 PROCEDURE — 1123F ACP DISCUSS/DSCN MKR DOCD: CPT | Performed by: PHYSICIAN ASSISTANT

## 2019-03-11 PROCEDURE — G8484 FLU IMMUNIZE NO ADMIN: HCPCS | Performed by: PHYSICIAN ASSISTANT

## 2019-03-11 PROCEDURE — G8598 ASA/ANTIPLAT THER USED: HCPCS | Performed by: PHYSICIAN ASSISTANT

## 2019-03-11 PROCEDURE — 1036F TOBACCO NON-USER: CPT | Performed by: PHYSICIAN ASSISTANT

## 2019-03-11 PROCEDURE — G8427 DOCREV CUR MEDS BY ELIG CLIN: HCPCS | Performed by: PHYSICIAN ASSISTANT

## 2019-03-11 RX ORDER — HYDROCODONE BITARTRATE AND ACETAMINOPHEN 5; 325 MG/1; MG/1
1-2 TABLET ORAL EVERY 6 HOURS PRN
Qty: 20 TABLET | Refills: 0 | Status: SHIPPED | OUTPATIENT
Start: 2019-03-11 | End: 2019-03-18

## 2019-03-11 RX ORDER — INDOMETHACIN 50 MG/1
50 CAPSULE ORAL 2 TIMES DAILY WITH MEALS
Qty: 20 CAPSULE | Refills: 0 | Status: SHIPPED | OUTPATIENT
Start: 2019-03-11 | End: 2019-05-01 | Stop reason: DRUGHIGH

## 2019-04-01 RX ORDER — LISINOPRIL 20 MG/1
TABLET ORAL
Qty: 90 TABLET | Refills: 3 | Status: SHIPPED | OUTPATIENT
Start: 2019-04-01 | End: 2020-01-16 | Stop reason: SDUPTHER

## 2019-04-02 ENCOUNTER — HOSPITAL ENCOUNTER (OUTPATIENT)
Age: 74
Discharge: HOME | End: 2019-04-02
Payer: MEDICARE

## 2019-04-02 DIAGNOSIS — I25.10 CAD IN NATIVE ARTERY: ICD-10-CM

## 2019-04-02 DIAGNOSIS — I10 BENIGN ESSENTIAL HTN: ICD-10-CM

## 2019-04-02 DIAGNOSIS — I73.9 PVD (PERIPHERAL VASCULAR DISEASE) (HCC): ICD-10-CM

## 2019-04-02 DIAGNOSIS — E78.5 HYPERLIPIDEMIA, UNSPECIFIED HYPERLIPIDEMIA TYPE: ICD-10-CM

## 2019-04-02 DIAGNOSIS — E11.59 TYPE 2 DIABETES MELLITUS WITH VASCULAR DISEASE (HCC): ICD-10-CM

## 2019-04-02 LAB
A/G RATIO: 1.3 (ref 1.1–2.2)
ALBUMIN SERPL-MCNC: 3.9 G/DL (ref 3.4–5)
ALP BLD-CCNC: 67 U/L (ref 40–129)
ALT SERPL-CCNC: 10 U/L (ref 10–40)
ANION GAP SERPL CALCULATED.3IONS-SCNC: 12 MMOL/L (ref 3–16)
AST SERPL-CCNC: 17 U/L (ref 15–37)
BILIRUB SERPL-MCNC: 0.4 MG/DL (ref 0–1)
BUN BLDV-MCNC: 15 MG/DL (ref 7–20)
CALCIUM SERPL-MCNC: 9.9 MG/DL (ref 8.3–10.6)
CHLORIDE BLD-SCNC: 103 MMOL/L (ref 99–110)
CHOLESTEROL, TOTAL: 128 MG/DL (ref 0–199)
CO2: 24 MMOL/L (ref 21–32)
CREAT SERPL-MCNC: 1 MG/DL (ref 0.8–1.3)
GFR AFRICAN AMERICAN: >60
GFR NON-AFRICAN AMERICAN: >60
GLOBULIN: 3.1 G/DL
GLUCOSE BLD-MCNC: 126 MG/DL (ref 70–99)
HDLC SERPL-MCNC: 58 MG/DL (ref 40–60)
LDL CHOLESTEROL CALCULATED: 56 MG/DL
POTASSIUM SERPL-SCNC: 4.4 MMOL/L (ref 3.5–5.1)
SODIUM BLD-SCNC: 139 MMOL/L (ref 136–145)
TOTAL PROTEIN: 7 G/DL (ref 6.4–8.2)
TRIGL SERPL-MCNC: 71 MG/DL (ref 0–150)
VLDLC SERPL CALC-MCNC: 14 MG/DL

## 2019-04-02 PROCEDURE — 80061 LIPID PANEL: CPT

## 2019-04-02 PROCEDURE — 80053 COMPREHEN METABOLIC PANEL: CPT

## 2019-04-02 PROCEDURE — 83036 HEMOGLOBIN GLYCOSYLATED A1C: CPT

## 2019-04-02 PROCEDURE — 36415 COLL VENOUS BLD VENIPUNCTURE: CPT

## 2019-04-03 LAB
ESTIMATED AVERAGE GLUCOSE: 159.9 MG/DL
HBA1C MFR BLD: 7.2 %

## 2019-05-01 ENCOUNTER — OFFICE VISIT (OUTPATIENT)
Dept: INTERNAL MEDICINE CLINIC | Age: 74
End: 2019-05-01
Payer: MEDICARE

## 2019-05-01 VITALS
RESPIRATION RATE: 12 BRPM | SYSTOLIC BLOOD PRESSURE: 120 MMHG | BODY MASS INDEX: 29.86 KG/M2 | DIASTOLIC BLOOD PRESSURE: 50 MMHG | WEIGHT: 197 LBS | HEIGHT: 68 IN | HEART RATE: 58 BPM

## 2019-05-01 DIAGNOSIS — R60.0 BILATERAL LEG EDEMA: ICD-10-CM

## 2019-05-01 DIAGNOSIS — I87.2 VENOUS INSUFFICIENCY OF BOTH LOWER EXTREMITIES: ICD-10-CM

## 2019-05-01 DIAGNOSIS — E11.59 TYPE 2 DIABETES MELLITUS WITH VASCULAR DISEASE (HCC): ICD-10-CM

## 2019-05-01 DIAGNOSIS — I10 BENIGN ESSENTIAL HTN: ICD-10-CM

## 2019-05-01 DIAGNOSIS — E11.42 DIABETIC PERIPHERAL NEUROPATHY (HCC): ICD-10-CM

## 2019-05-01 DIAGNOSIS — Z12.5 SCREENING FOR PROSTATE CANCER: ICD-10-CM

## 2019-05-01 DIAGNOSIS — I25.10 CAD IN NATIVE ARTERY: ICD-10-CM

## 2019-05-01 DIAGNOSIS — E78.5 HYPERLIPIDEMIA, UNSPECIFIED HYPERLIPIDEMIA TYPE: ICD-10-CM

## 2019-05-01 DIAGNOSIS — M10.9 GOUTY ARTHRITIS OF LEFT GREAT TOE: ICD-10-CM

## 2019-05-01 DIAGNOSIS — I65.23 ASYMPTOMATIC BILATERAL CAROTID ARTERY STENOSIS: ICD-10-CM

## 2019-05-01 DIAGNOSIS — I73.9 PVD (PERIPHERAL VASCULAR DISEASE) (HCC): ICD-10-CM

## 2019-05-01 PROCEDURE — G8417 CALC BMI ABV UP PARAM F/U: HCPCS | Performed by: INTERNAL MEDICINE

## 2019-05-01 PROCEDURE — G8598 ASA/ANTIPLAT THER USED: HCPCS | Performed by: INTERNAL MEDICINE

## 2019-05-01 PROCEDURE — 1123F ACP DISCUSS/DSCN MKR DOCD: CPT | Performed by: INTERNAL MEDICINE

## 2019-05-01 PROCEDURE — 3045F PR MOST RECENT HEMOGLOBIN A1C LEVEL 7.0-9.0%: CPT | Performed by: INTERNAL MEDICINE

## 2019-05-01 PROCEDURE — G8427 DOCREV CUR MEDS BY ELIG CLIN: HCPCS | Performed by: INTERNAL MEDICINE

## 2019-05-01 PROCEDURE — 4040F PNEUMOC VAC/ADMIN/RCVD: CPT | Performed by: INTERNAL MEDICINE

## 2019-05-01 PROCEDURE — 1036F TOBACCO NON-USER: CPT | Performed by: INTERNAL MEDICINE

## 2019-05-01 PROCEDURE — 2022F DILAT RTA XM EVC RTNOPTHY: CPT | Performed by: INTERNAL MEDICINE

## 2019-05-01 PROCEDURE — 3017F COLORECTAL CA SCREEN DOC REV: CPT | Performed by: INTERNAL MEDICINE

## 2019-05-01 PROCEDURE — 99214 OFFICE O/P EST MOD 30 MIN: CPT | Performed by: INTERNAL MEDICINE

## 2019-05-01 RX ORDER — INDOMETHACIN 50 MG/1
50 CAPSULE ORAL
Qty: 30 CAPSULE | Refills: 0 | Status: SHIPPED | OUTPATIENT
Start: 2019-05-01 | End: 2020-01-15 | Stop reason: ALTCHOICE

## 2019-05-01 ASSESSMENT — PATIENT HEALTH QUESTIONNAIRE - PHQ9
1. LITTLE INTEREST OR PLEASURE IN DOING THINGS: 0
SUM OF ALL RESPONSES TO PHQ QUESTIONS 1-9: 0
SUM OF ALL RESPONSES TO PHQ QUESTIONS 1-9: 0
SUM OF ALL RESPONSES TO PHQ9 QUESTIONS 1 & 2: 0
2. FEELING DOWN, DEPRESSED OR HOPELESS: 0

## 2019-05-01 NOTE — PATIENT INSTRUCTIONS
Patient Education        Carotid Stenosis: Care Instructions  Your Care Instructions    Carotid stenosis is narrowing of one or both of the carotid arteries. These arteries take blood from the heart to the brain. There is one on each side of the neck. A substance called plaque builds up inside an artery. This makes it too narrow. Plaque comes from damage to the artery over time. This damage may be caused by high blood pressure, high cholesterol, diabetes, or smoking. Sometimes plaque can break loose from the carotid artery and move to the brain. This can cause a stroke or transient ischemic attack (TIA). The goal of treatment is to lower your risk of having a stroke or TIA. You can lower your risk by making healthy lifestyle changes and taking medicine. Sometimes a surgery or procedure is done. Follow-up care is a key part of your treatment and safety. Be sure to make and go to all appointments, and call your doctor if you are having problems. It's also a good idea to know your test results and keep a list of the medicines you take. How can you care for yourself at home? · Take your medicines exactly as prescribed. Call your doctor if you think you are having a problem with your medicine. You may take medicine to lower your blood pressure, to lower your cholesterol, or to prevent blood clots. · If you take a blood thinner, such as aspirin, be sure to get instructions about how to take your medicine safely. Blood thinners can cause serious bleeding problems. · Do not smoke. People who smoke have a higher chance of stroke than those who quit. If you need help quitting, talk to your doctor about stop-smoking programs and medicines. These can increase your chances of quitting for good. · Eat a healthy diet that is low in saturated fat and salt. Eat lots of fresh fruits and vegetables and foods high in fiber. · Stay at a healthy weight. Lose weight if you need to.   · Talk to your doctor about starting an exercise program. Regular exercise lowers your chance of stroke. · Limit alcohol to 2 drinks a day for men and 1 drink a day for women. Too much alcohol can cause health problems. · Work with your doctor to control high blood pressure, high cholesterol, diabetes, and other conditions that increase your chance of a stroke. A healthy diet, exercise, weight loss (if needed), and medicines can help. · Avoid colds and flu. Get the flu vaccine every year. When should you call for help? Call 911 anytime you think you may need emergency care. For example, call if:    · You passed out (lost consciousness).     · You have symptoms of a stroke. These may include:  ? Sudden numbness, tingling, weakness, or loss of movement in your face, arm, or leg, especially on only one side of your body. ? Sudden vision changes. ? Sudden trouble speaking. ? Sudden confusion or trouble understanding simple statements. ? Sudden problems with walking or balance. ? A sudden, severe headache that is different from past headaches.    Call your doctor now or seek immediate medical care if:    · You are dizzy or lightheaded, or you feel like you may faint.    Watch closely for changes in your health, and be sure to contact your doctor if you have any problems. Where can you learn more? Go to https://Solaria.Oktagon Games. org and sign in to your GTRAN account. Enter F300 in the Plink Search box to learn more about \"Carotid Stenosis: Care Instructions. \"     If you do not have an account, please click on the \"Sign Up Now\" link. Current as of: July 22, 2018  Content Version: 11.9  © 2058-0879 Zapya, Incorporated. Care instructions adapted under license by [facility] ([facility]). If you have questions about a medical condition or this instruction, always ask your healthcare professional. [place] [address] any warranty or liability for your use of this information.          Patient Education        Learning you have a balanced meal. It also helps you spread carbs throughout the day. ? Divide your plate by types of foods. Put non-starchy vegetables on half the plate, meat or other protein food on one-quarter of the plate, and a grain or starchy vegetable in the final quarter of the plate. To this you can add a small piece of fruit and 1 cup of milk or yogurt, depending on how many carbs you are supposed to eat at a meal.  · Try to eat about the same amount of carbs at each meal. Do not \"save up\" your daily allowance of carbs to eat at one meal.  · Proteins have very little or no carbs per serving. Examples of proteins are beef, chicken, turkey, fish, eggs, tofu, cheese, cottage cheese, and peanut butter. A serving size of meat is 3 ounces, which is about the size of a deck of cards. Examples of meat substitute serving sizes (equal to 1 ounce of meat) are 1/4 cup of cottage cheese, 1 egg, 1 tablespoon of peanut butter, and ½ cup of tofu. How can you eat out and still eat healthy? · Learn to estimate the serving sizes of foods that have carbohydrate. If you measure food at home, it will be easier to estimate the amount in a serving of restaurant food. · If the meal you order has too much carbohydrate (such as potatoes, corn, or baked beans), ask to have a low-carbohydrate food instead. Ask for a salad or green vegetables. · If you use insulin, check your blood sugar before and after eating out to help you plan how much to eat in the future. · If you eat more carbohydrate at a meal than you had planned, take a walk or do other exercise. This will help lower your blood sugar. What else should you know? · Limit saturated fat, such as the fat from meat and dairy products. This is a healthy choice because people who have diabetes are at higher risk of heart disease. So choose lean cuts of meat and nonfat or low-fat dairy products. Use olive or canola oil instead of butter or shortening when cooking.   · Don't skip meals. Your blood sugar may drop too low if you skip meals and take insulin or certain medicines for diabetes. · Check with your doctor before you drink alcohol. Alcohol can cause your blood sugar to drop too low. Alcohol can also cause a bad reaction if you take certain diabetes medicines. Follow-up care is a key part of your treatment and safety. Be sure to make and go to all appointments, and call your doctor if you are having problems. It's also a good idea to know your test results and keep a list of the medicines you take. Where can you learn more? Go to https://CertesspepicewLuminaCare Solutions.Wheeler [employer]. org and sign in to your Skynet Labs account. Enter Q731 in the slinkset box to learn more about \"Learning About Diabetes Food Guidelines. \"     If you do not have an account, please click on the \"Sign Up Now\" link. Current as of: July 25, 2018  Content Version: 11.9  © 0562-1096 Virdocs Software. Care instructions adapted under license by [facility] ([facility]). If you have questions about a medical condition or this instruction, always ask your healthcare professional. [place] [address] any warranty or liability for your use of this information. Patient Education        Learning About Meal Planning for Diabetes  Why plan your meals? Meal planning can be a key part of managing diabetes. Planning meals and snacks with the right balance of carbohydrate, protein, and fat can help you keep your blood sugar at the target level you set with your doctor. You don't have to eat special foods. You can eat what your family eats, including sweets once in a while. But you do have to pay attention to how often you eat and how much you eat of certain foods. You may want to work with a dietitian or a certified diabetes educator. He or she can give you tips and meal ideas and can answer your questions about meal planning.  This health professional can also help you reach a healthy weight if that is registered dietitian or diabetes educator can help you plan how much carbohydrate to include in each meal and snack. A guideline for your daily amount of carbohydrate is:  · 45 to 60 grams at each meal. That's about the same as 3 to 4 carbohydrate servings. · 15 to 20 grams at each snack. That's about the same as 1 carbohydrate serving. The Nutrition Facts label on packaged foods tells you how much carbohydrate is in a serving of the food. First, look at the serving size on the food label. Is that the amount you eat in a serving? All of the nutrition information on a food label is based on that serving size. So if you eat more or less than that, you'll need to adjust the other numbers. Total carbohydrate is the next thing you need to look for on the label. If you count carbohydrate servings, one serving of carbohydrate is 15 grams. For foods that don't come with labels, such as fresh fruits and vegetables, you'll need a guide that lists carbohydrate in these foods. Ask your doctor, dietitian, or diabetes educator about books or other nutrition guides you can use. If you take insulin, you need to know how many grams of carbohydrate are in a meal. This lets you know how much rapid-acting insulin to take before you eat. If you use an insulin pump, you get a constant rate of insulin during the day. So the pump must be programmed at meals to give you extra insulin to cover the rise in blood sugar after meals. When you know how much carbohydrate you will eat, you can take the right amount of insulin. Or, if you always use the same amount of insulin, you need to make sure that you eat the same amount of carbohydrate at meals. If you need more help to understand carbohydrate counting and food labels, ask your doctor, dietitian, or diabetes educator. How do you get started with meal planning? Here are some tips to get started:  · Plan your meals a week at a time. Don't forget to include snacks too.   · Use cookbooks or online recipes to plan several main meals. Plan some quick meals for busy nights. You also can double some recipes that freeze well. Then you can save half for other busy nights when you don't have time to cook. · Make sure you have the ingredients you need for your recipes. If you're running low on basic items, put these items on your shopping list too. · List foods that you use to make breakfasts, lunches, and snacks. List plenty of fruits and vegetables. · Post this list on the refrigerator. Add to it as you think of more things you need. · Take the list to the store to do your weekly shopping. Follow-up care is a key part of your treatment and safety. Be sure to make and go to all appointments, and call your doctor if you are having problems. It's also a good idea to know your test results and keep a list of the medicines you take. Where can you learn more? Go to https://Fenix Biotechperosanne.SkyBulls. org and sign in to your InTuun Systems account. Enter F627 in the Allakos box to learn more about \"Learning About Meal Planning for Diabetes. \"     If you do not have an account, please click on the \"Sign Up Now\" link. Current as of: July 25, 2018  Content Version: 11.9  © 0667-7725 Bharat Matrimony, Incorporated. Care instructions adapted under license by [facility] ([facility]). If you have questions about a medical condition or this instruction, always ask your healthcare professional. [place] [address] any warranty or liability for your use of this information. Patient Education        Leg and Ankle Edema: Care Instructions  Your Care Instructions  Swelling in the legs, ankles, and feet is called edema. It is common after you sit or stand for a while. Long plane flights or car rides often cause swelling in the legs and feet. You may also have swelling if you have to stand for long periods of time at your job.  Problems with the veins in the legs (varicose veins) and changes in hormones swelling. · Rest sore joints. Avoid activities that put weight or strain on the joints for a few days. Take short rest breaks from your regular activities during the day. · Take your medicines exactly as prescribed. Call your doctor if you think you are having a problem with your medicine. · Take pain medicines exactly as directed. ? If the doctor gave you a prescription medicine for pain, take it as prescribed. ? If you are not taking a prescription pain medicine, ask your doctor if you can take an over-the-counter medicine. · Eat less seafood and red meat. · Check with your doctor before drinking alcohol. · Losing weight, if you are overweight, may help reduce attacks of gout. But do not go on a [employer]. \" Losing a lot of weight in a short amount of time can cause a gout attack. When should you call for help? Call your doctor now or seek immediate medical care if:    · You have a fever.     · The joint is so painful you cannot use it.     · You have sudden, unexplained swelling, redness, warmth, or severe pain in one or more joints.    Watch closely for changes in your health, and be sure to contact your doctor if:    · You have joint pain.     · Your symptoms get worse or are not improving after 2 or 3 days. Where can you learn more? Go to https://Aspen Avionics.MailLift. org and sign in to your ZenDay account. Enter J244 in the 5 O'Clock Records box to learn more about \"Gout: Care Instructions. \"     If you do not have an account, please click on the \"Sign Up Now\" link. Current as of: Flavia 10, 2018  Content Version: 11.9  © 6904-7380 VirnetX, Incorporated. Care instructions adapted under license by [email or web address] ([facility]). If you have questions about a medical condition or this instruction, always ask your healthcare professional. [place] [address] any warranty or liability for your use of this information.          Patient Education        Purine-Restricted Diet: Care Instructions  Your Care Instructions    Purines are substances that are found in some foods. Your body turns purines into uric acid. High levels of uric acid can cause gout, which is a form of arthritis that causes pain and inflammation in joints. You may be able to help control the amount of uric acid in your body by limiting high-purine foods in your diet. Follow-up care is a key part of your treatment and safety. Be sure to make and go to all appointments, and call your doctor if you are having problems. It's also a good idea to know your test results and keep a list of the medicines you take. How can you care for yourself at home? · Plan your meals and snacks around foods that are low in purines and are safe for you to eat. These foods include:  ? Green vegetables and tomatoes. ? Fruits. ? Whole-grain breads, rice, and cereals. ? Eggs, peanut butter, and nuts. ? Low-fat milk, cheese, and other milk products. ? Popcorn. ? Gelatin desserts, chocolate, cocoa, and cakes and sweets, in small amounts. · You can eat certain foods that are medium-high in purines, but eat them only once in a while. These foods include:  ? Legumes, such as dried beans and dried peas. You can have 1 cup cooked legumes each day. ? Asparagus, cauliflower, spinach, mushrooms, and green peas. ? Fish and seafood (other than very high-purine seafood). ? Oatmeal, wheat bran, and wheat germ. · Limit very high-purine foods, including:  ? Organ meats, such as liver, kidneys, sweetbreads, and brains. ? Meats, including regalado, beef, pork, and lamb. ? Game meats and any other meats in large amounts. ? Anchovies, sardines, herring, mackerel, and scallops. ? Gravy. ? Beer. Where can you learn more? Go to https://mitchel.The Luxe Nomad. org and sign in to your 8D World account. Enter F448 in the KylesVidaao box to learn more about \"Purine-Restricted Diet: Care Instructions. \"     If you do not have an account, please click on the \"Sign Up Now\" link. Current as of: March 28, 2018  Content Version: 11.9  © 6172-6088 Fabulyzer, Incorporated. Care instructions adapted under license by [facility] ([facility]). If you have questions about a medical condition or this instruction, always ask your healthcare professional. [address] any warranty or liability for your use of this information.

## 2019-05-01 NOTE — PROGRESS NOTES
[facility]) Physicians  Internal Medicine  Patient Encounter  Philip Walton D.O., [facility]           Chief Complaint   Patient presents with   [facility]    Medication Check       HPI: 68 y.o. male with multiple medical problems seen today regarding the status of his current chronic medical problems below and medication review. He has been feeling \"OK. \"      He went to an after hours ortho [facility] clinic. He saw  A PA for left foot pain. He had pain, swelling, redness at the 1st MTP joint. X-ray showed inflammatory change over the 1st MTP joint. He was thought to have \"gouty arthritis. \"    Actually, sounds like acute gout-- Podagra. Also sounds like he did not need the x-ray. He was given Indomethacin and Norco. He was given Indomethacin 50 mg BID. He decreased red meat. Diabetes Mellitus Type II, Follow-up--   Lab Results   Component Value Date    LABA1C 7.2 04/02/2019     He does not check his blood sugars-- refuses to start. He continues to have difficulty maintaining low carb eating. He eats a lot of carbs. He does not exercise. He states he does not want more medication. He expressed his understanding of uncontrolled diabetes. Last Eye Exam-- 11/2018. U.Microalbumin/Cr-- 1/23/2019  Pt is on ACEI --Lisinopril. +ASA 81 mg daily   Complications--  Neuropathy, PVD. No tobacco use  LDL--73  + Statin    HTN-- He denies new problems. He denies HA's, dizziness, unilateral weakness or paresthesias. He denies lightheadedness. Patient does state he can have some swelling by the end of the day but is better by morning. Hyperlipidemia:    Lab Results   Component Value Date    LDLCALC 56 04/02/2019     Patient is been on simvastatin for several years. He has not had any adverse effects. Today he denies any new episodes of myalgias or muscle weakness. He is sedentary. 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 Angiogram 4/29/2013. He sees Dr. Lorin Adame.     He denies new bony hypertrophy. No tenderness. ASSESSMENT/PLAN:    1. Type 2 diabetes, uncontrolled, with neuropathy (HCC)  Condition is uncontrolled based on A1c level but for Makayla Monday, 7.2% is pretty good  Continue current medications  Can consider adding a third medication though he has consistently been resistant to adding any new medications  The Actos may be contributing to some lower extremity edema though his weight is actually down a few pounds  He may do well with a very low dose of a sulfonylurea. Consider glipizide ER    2. Type 2 diabetes mellitus with vascular disease ([address]  As above    3. Diabetic peripheral neuropathy (HCC)  Condition is stable without complaints of worsening dysesthesias in his feet  Continue tight diabetic control as much as possible    4. Benign essential HTN  Blood pressure is well controlled  Continue current medication  No added salt diet as noted below    5. Gouty arthritis of left great toe  This is a new problem. He was seen at orthopedic walk-in clinic at [facility]. It sounds like he had a bout of Podagra. The indomethacin was very helpful. Continue to monitor for recurrence  Literature provided on a purine restricted diet. Check uric acid  - indomethacin (INDOCIN) 50 MG capsule; Take 1 capsule by mouth 3 times daily (with meals)  Dispense: 30 capsule; Refill: 0  - Uric Acid; Future    6. Hyperlipidemia, unspecified hyperlipidemia type  Condition is controlled  Continue statin therapy for cardiovascular risk reduction. 7. CAD in native artery  Condition is stable and well-controlled without any evidence of angina or anginal equivalence or overt fluid overload. He does have some edema in his lower extremities but this is likely venous insufficiency  Continue cardiovascular risk factor management. Follow-up with cardiology    8. Asymptomatic bilateral carotid artery stenosis  Condition stability is uncertain.   His cardiologist will order the next carotid Doppler    9. PVD (peripheral vascular disease) (HCC)  Condition is stable without signs of claudication. Continue current cardiovascular risk factor reduction strategies    10. Screening for prostate cancer    - Psa screening; Future    11. Bilateral leg edema  Likely due to venous insufficiency    12.  Venous insufficiency of both lower extremities  Edema seems a little worse today  Suggest compression stockings  No added salt diet  Elevation of lower extremities while at rest

## 2019-07-10 RX ORDER — PIOGLITAZONEHYDROCHLORIDE 30 MG/1
TABLET ORAL
Qty: 90 TABLET | Refills: 3 | Status: SHIPPED | OUTPATIENT
Start: 2019-07-10 | End: 2020-01-16 | Stop reason: SDUPTHER

## 2019-07-29 RX ORDER — TAMSULOSIN HYDROCHLORIDE 0.4 MG/1
CAPSULE ORAL
Qty: 90 CAPSULE | Refills: 3 | Status: SHIPPED | OUTPATIENT
Start: 2019-07-29 | End: 2020-01-16 | Stop reason: SDUPTHER

## 2019-09-04 ENCOUNTER — OFFICE VISIT (OUTPATIENT)
Dept: INTERNAL MEDICINE CLINIC | Age: 74
End: 2019-09-04
Payer: MEDICARE

## 2019-09-04 VITALS
RESPIRATION RATE: 12 BRPM | WEIGHT: 191 LBS | HEART RATE: 60 BPM | SYSTOLIC BLOOD PRESSURE: 115 MMHG | BODY MASS INDEX: 27.35 KG/M2 | DIASTOLIC BLOOD PRESSURE: 50 MMHG | HEIGHT: 70 IN

## 2019-09-04 DIAGNOSIS — I10 BENIGN ESSENTIAL HTN: ICD-10-CM

## 2019-09-04 DIAGNOSIS — E11.42 DIABETIC PERIPHERAL NEUROPATHY (HCC): ICD-10-CM

## 2019-09-04 DIAGNOSIS — E78.5 HYPERLIPIDEMIA, UNSPECIFIED HYPERLIPIDEMIA TYPE: ICD-10-CM

## 2019-09-04 DIAGNOSIS — I25.10 CAD IN NATIVE ARTERY: ICD-10-CM

## 2019-09-04 DIAGNOSIS — I73.9 PVD (PERIPHERAL VASCULAR DISEASE) (HCC): ICD-10-CM

## 2019-09-04 DIAGNOSIS — E11.59 TYPE 2 DIABETES MELLITUS WITH VASCULAR DISEASE (HCC): Primary | ICD-10-CM

## 2019-09-04 DIAGNOSIS — N28.9 RENAL INSUFFICIENCY: Primary | ICD-10-CM

## 2019-09-04 DIAGNOSIS — I87.2 VENOUS INSUFFICIENCY OF BOTH LOWER EXTREMITIES: ICD-10-CM

## 2019-09-04 PROCEDURE — G8427 DOCREV CUR MEDS BY ELIG CLIN: HCPCS | Performed by: INTERNAL MEDICINE

## 2019-09-04 PROCEDURE — G8417 CALC BMI ABV UP PARAM F/U: HCPCS | Performed by: INTERNAL MEDICINE

## 2019-09-04 PROCEDURE — 4040F PNEUMOC VAC/ADMIN/RCVD: CPT | Performed by: INTERNAL MEDICINE

## 2019-09-04 PROCEDURE — 2022F DILAT RTA XM EVC RTNOPTHY: CPT | Performed by: INTERNAL MEDICINE

## 2019-09-04 PROCEDURE — 3017F COLORECTAL CA SCREEN DOC REV: CPT | Performed by: INTERNAL MEDICINE

## 2019-09-04 PROCEDURE — 99214 OFFICE O/P EST MOD 30 MIN: CPT | Performed by: INTERNAL MEDICINE

## 2019-09-04 PROCEDURE — 1123F ACP DISCUSS/DSCN MKR DOCD: CPT | Performed by: INTERNAL MEDICINE

## 2019-09-04 PROCEDURE — 1036F TOBACCO NON-USER: CPT | Performed by: INTERNAL MEDICINE

## 2019-09-04 PROCEDURE — G8598 ASA/ANTIPLAT THER USED: HCPCS | Performed by: INTERNAL MEDICINE

## 2019-09-04 PROCEDURE — 3045F PR MOST RECENT HEMOGLOBIN A1C LEVEL 7.0-9.0%: CPT | Performed by: INTERNAL MEDICINE

## 2019-10-22 ENCOUNTER — NURSE ONLY (OUTPATIENT)
Dept: INTERNAL MEDICINE CLINIC | Age: 74
End: 2019-10-22
Payer: MEDICARE

## 2019-10-22 DIAGNOSIS — Z12.11 COLON CANCER SCREENING: Primary | ICD-10-CM

## 2019-10-22 LAB
CONTROL: NORMAL
HEMOCCULT STL QL: NEGATIVE

## 2019-10-22 PROCEDURE — 82274 ASSAY TEST FOR BLOOD FECAL: CPT | Performed by: INTERNAL MEDICINE

## 2020-01-15 ENCOUNTER — OFFICE VISIT (OUTPATIENT)
Dept: INTERNAL MEDICINE CLINIC | Age: 75
End: 2020-01-15
Payer: MEDICARE

## 2020-01-15 VITALS
BODY MASS INDEX: 29.1 KG/M2 | RESPIRATION RATE: 12 BRPM | HEART RATE: 66 BPM | WEIGHT: 192 LBS | DIASTOLIC BLOOD PRESSURE: 50 MMHG | HEIGHT: 68 IN | SYSTOLIC BLOOD PRESSURE: 122 MMHG

## 2020-01-15 LAB
BILIRUBIN URINE: NEGATIVE
BLOOD, URINE: NEGATIVE
CLARITY: CLEAR
COLOR: NORMAL
CREATININE URINE: 102.5 MG/DL (ref 39–259)
EPITHELIAL CELLS, UA: 0 /HPF (ref 0–5)
GLUCOSE URINE: NEGATIVE MG/DL
HYALINE CASTS: 0 /LPF (ref 0–8)
KETONES, URINE: NEGATIVE MG/DL
LEUKOCYTE ESTERASE, URINE: NEGATIVE
MICROALBUMIN UR-MCNC: <1.2 MG/DL
MICROALBUMIN/CREAT UR-RTO: NORMAL MG/G (ref 0–30)
MICROSCOPIC EXAMINATION: NORMAL
NITRITE, URINE: NEGATIVE
PH UA: 6 (ref 5–8)
PROTEIN UA: NEGATIVE MG/DL
RBC UA: 0 /HPF (ref 0–4)
SPECIFIC GRAVITY UA: 1.02 (ref 1–1.03)
URINE TYPE: NORMAL
UROBILINOGEN, URINE: 1 E.U./DL
WBC UA: 0 /HPF (ref 0–5)

## 2020-01-15 PROCEDURE — G8428 CUR MEDS NOT DOCUMENT: HCPCS | Performed by: INTERNAL MEDICINE

## 2020-01-15 PROCEDURE — 4040F PNEUMOC VAC/ADMIN/RCVD: CPT | Performed by: INTERNAL MEDICINE

## 2020-01-15 PROCEDURE — 1123F ACP DISCUSS/DSCN MKR DOCD: CPT | Performed by: INTERNAL MEDICINE

## 2020-01-15 PROCEDURE — 3288F FALL RISK ASSESSMENT DOCD: CPT | Performed by: INTERNAL MEDICINE

## 2020-01-15 PROCEDURE — G8510 SCR DEP NEG, NO PLAN REQD: HCPCS | Performed by: INTERNAL MEDICINE

## 2020-01-15 PROCEDURE — 99214 OFFICE O/P EST MOD 30 MIN: CPT | Performed by: INTERNAL MEDICINE

## 2020-01-15 PROCEDURE — G8484 FLU IMMUNIZE NO ADMIN: HCPCS | Performed by: INTERNAL MEDICINE

## 2020-01-15 PROCEDURE — 3017F COLORECTAL CA SCREEN DOC REV: CPT | Performed by: INTERNAL MEDICINE

## 2020-01-15 PROCEDURE — 2022F DILAT RTA XM EVC RTNOPTHY: CPT | Performed by: INTERNAL MEDICINE

## 2020-01-15 PROCEDURE — 3046F HEMOGLOBIN A1C LEVEL >9.0%: CPT | Performed by: INTERNAL MEDICINE

## 2020-01-15 PROCEDURE — G8417 CALC BMI ABV UP PARAM F/U: HCPCS | Performed by: INTERNAL MEDICINE

## 2020-01-15 PROCEDURE — 1036F TOBACCO NON-USER: CPT | Performed by: INTERNAL MEDICINE

## 2020-01-15 ASSESSMENT — PATIENT HEALTH QUESTIONNAIRE - PHQ9
SUM OF ALL RESPONSES TO PHQ9 QUESTIONS 1 & 2: 0
2. FEELING DOWN, DEPRESSED OR HOPELESS: 0
SUM OF ALL RESPONSES TO PHQ QUESTIONS 1-9: 0
1. LITTLE INTEREST OR PLEASURE IN DOING THINGS: 0
SUM OF ALL RESPONSES TO PHQ QUESTIONS 1-9: 0

## 2020-01-15 NOTE — PROGRESS NOTES
[facility]) Physicians  Internal Medicine  Patient Encounter  Arnav Sol D.O., [facility]           Chief Complaint   Patient presents with    Medication Check    Check-Up       HPI: 76 y.o. male with multiple medical problems seen today regarding the status of his current chronic medical problems below and medication review. He has been feeling \"OK. \"          Diabetes Mellitus Type II, Follow-up--   Lab Results   Component Value Date    LABA1C 6.7 09/04/2019   He has kept his weight off. He is trying to follow low carb, but he has slacked over the holidays. He denies excessive thirst or frequent urination. He does not check his sugars. Last Eye Exam-- 12/2/2019 at Dr. Noel Starkey office. U.Microalbumin/Cr-- 1/23/2019, over  Pt is on ACEI --Lisinopril. +ASA 81 mg daily   Complications--  Neuropathy, PVD. No tobacco use  LDL--68  + Statin    HTN-- he states she does not check his home. He is taking his medication. He denies lightheadedness, dizziness, syncope. He denies unilateral weakness or paresthesias. No episodes of speech problems. Hyperlipidemia:    Lab Results   Component Value Date    [address] 09/04/2019     He states he has been on Simvastatin for several years. He denies myalgias, muscle cramping, muscle weakness. 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 Angiogram 4/29/2013. He sees   He was last seen by his cardiologist 6/19/2019. No changes. He is seen annually. He denies CP, SOB, palpitations, syncope, dizziness. Carotid stenosis-- Previous history----> Last carotid doppler 8/5/2019--- <50% right, 50-69% Left. As stated above, he denies TIA or stroke related problems. PVD/venous insufficiency-- He denies more swelling than usual.  He denies claudication with exertion. He has been walking outside.           Past Medical History:   Diagnosis Date    CAD in native artery     Carotid stenoses 4/29/2013    20-49% BL    Cataracts, bilateral  Gouty arthritis of left great toe 5/1/2019    Hyperlipidemia     Hypertension     PVD (peripheral vascular disease) (HCC)     Type II or unspecified type diabetes mellitus without mention of complication, not stated as uncontrolled        Review of Systems   As per HPI      OBJECTIVE:  BP (!) 122/50   Pulse 66   Resp 12   Ht 5' 8\" (1.727 m)   Wt 192 lb (87.1 kg)   BMI 29.19 kg/m²     Wt Readings from Last 3 Encounters:   01/15/20 192 lb (87.1 kg)   09/04/19 191 lb (86.6 kg)   05/01/19 197 lb (89.4 kg)     BP Readings from Last 3 Encounters:   01/15/20 (!) 122/50   09/04/19 (!) 115/50   05/01/19 (!) 120/50       GEN: NAD, A&O, Overweight. HEENT:  NC/AT, CYN, EOMI, TM's NL, Anicteric. Oral cavity is clear without mucosal lesions. Throat is NL. NECK: Supple  No thyromegaly or nodules. No JVD. Trachea midline. LYMPH: No C/SC/A nodes  CV: Rhythm is regular. Rate normal.  No murmurs. No ectopy. PULM: CTA  EXT: No pitting edema today. GI: Abd. Soft, NT, No masses. No hepatomegaly. No splenomegaly  NEURO:  No focal or lateralizing deficits. Monofilament testing intact both feet. Vibratory sensation in tact in both feet. Moves all extremities symmetrically. No ataxia. SKIN:  No rashes   VASC: No carotid bruits. Pedal pulses are difficult to palpate but are palpable and faint. MS: No synovitis. No scoliosis. ASSESSMENT/PLAN:    1. Type 2 diabetes, uncontrolled, with neuropathy ([address]  Diabetes was well controlled based on last A1c. He does not have any home blood sugar readings to ensure stability and control  Due for lab  Foot exam done today  Continue lifestyle modification  -  DIABETES FOOT EXAM  - URINALYSIS WITH MICROSCOPIC  - Microalbumin / Creatinine Urine Ratio  - Comprehensive Metabolic Panel; Future  - Lipid Panel; Future  - Hemoglobin A1C; Future    2.  Type 2 diabetes mellitus with vascular disease ([address]  As above  - URINALYSIS WITH MICROSCOPIC  - Comprehensive Metabolic Panel; Future  - Lipid Panel; Future  - Hemoglobin A1C; Future    3. Diabetic peripheral neuropathy (HCC)  No subjective or objective findings to suggest worsening dysesthesias or neuropathy in the feet. 4. PVD (peripheral vascular disease) (HCC)  Condition is stable without signs of claudication. His pulses are a bit faint today. He is asymptomatic. Continue to monitor for arterial insufficiency    5. CAD in native artery  Condition is stable without signs of angina or anginal equivalents. Continue monitoring for cardiac decompensation. Continue current medications  - Comprehensive Metabolic Panel; Future  - Lipid Panel; Future    6. Benign essential HTN  Blood pressure is well controlled  Continue to monitor for hypotension  Continue current medications  - Comprehensive Metabolic Panel; Future  - Lipid Panel; Future  - CBC Auto Differential; Future    7. Hyperlipidemia, unspecified hyperlipidemia type  Condition stability is uncertain. Due for lab  Continue statin therapy as part of his cardiovascular disease risk reduction strategy. - Comprehensive Metabolic Panel; Future  - Lipid Panel; Future    8. Asymptomatic bilateral carotid artery stenosis  Managed by cardiology. - Lipid Panel; Future    9. Drug therapy  Patient is on Actos. Will screen for hematuria. - URINALYSIS WITH MICROSCOPIC        Patient given a FIT for colon cancer screening.

## 2020-01-16 RX ORDER — PIOGLITAZONEHYDROCHLORIDE 30 MG/1
TABLET ORAL
Qty: 90 TABLET | Refills: 3 | Status: SHIPPED | OUTPATIENT
Start: 2020-01-16 | End: 2021-01-20 | Stop reason: SDUPTHER

## 2020-01-16 RX ORDER — TAMSULOSIN HYDROCHLORIDE 0.4 MG/1
CAPSULE ORAL
Qty: 90 CAPSULE | Refills: 3 | Status: SHIPPED | OUTPATIENT
Start: 2020-01-16 | End: 2021-01-20 | Stop reason: SDUPTHER

## 2020-01-16 RX ORDER — SIMVASTATIN 40 MG
40 TABLET ORAL NIGHTLY
Qty: 90 TABLET | Refills: 3 | Status: SHIPPED | OUTPATIENT
Start: 2020-01-16 | End: 2021-01-20 | Stop reason: SDUPTHER

## 2020-01-16 RX ORDER — LISINOPRIL 20 MG/1
TABLET ORAL
Qty: 90 TABLET | Refills: 3 | Status: SHIPPED | OUTPATIENT
Start: 2020-01-16 | End: 2021-01-20 | Stop reason: SDUPTHER

## 2020-01-23 LAB
ALBUMIN SERPL-MCNC: 3.4 G/DL
ALP BLD-CCNC: 61 U/L
ALT SERPL-CCNC: 7 U/L
ANION GAP SERPL CALCULATED.3IONS-SCNC: NORMAL MMOL/L
AST SERPL-CCNC: 10 U/L
AVERAGE GLUCOSE: 122
BASOPHILS ABSOLUTE: ABNORMAL
BASOPHILS RELATIVE PERCENT: ABNORMAL
BILIRUB SERPL-MCNC: 0.5 MG/DL (ref 0.1–1.4)
BUN BLDV-MCNC: 21 MG/DL
CALCIUM SERPL-MCNC: 10 MG/DL
CHLORIDE BLD-SCNC: 108 MMOL/L
CHOLESTEROL, TOTAL: 133 MG/DL
CHOLESTEROL/HDL RATIO: 2.4
CO2: 26 MMOL/L
CREAT SERPL-MCNC: 1.27 MG/DL
EOSINOPHILS ABSOLUTE: ABNORMAL
EOSINOPHILS RELATIVE PERCENT: ABNORMAL
GFR CALCULATED: NORMAL
GLUCOSE BLD-MCNC: 122 MG/DL
HBA1C MFR BLD: 6.8 %
HCT VFR BLD CALC: 33.4 % (ref 41–53)
HDLC SERPL-MCNC: 55 MG/DL (ref 35–70)
HEMOGLOBIN: 10.9 G/DL (ref 13.5–17.5)
LDL CHOLESTEROL CALCULATED: 61 MG/DL (ref 0–160)
LYMPHOCYTES ABSOLUTE: ABNORMAL
LYMPHOCYTES RELATIVE PERCENT: ABNORMAL
MCH RBC QN AUTO: ABNORMAL PG
MCHC RBC AUTO-ENTMCNC: ABNORMAL G/DL
MCV RBC AUTO: ABNORMAL FL
MONOCYTES ABSOLUTE: ABNORMAL
MONOCYTES RELATIVE PERCENT: ABNORMAL
NEUTROPHILS ABSOLUTE: ABNORMAL
NEUTROPHILS RELATIVE PERCENT: ABNORMAL
PLATELET # BLD: 208 K/ΜL
PMV BLD AUTO: ABNORMAL FL
POTASSIUM SERPL-SCNC: 4.6 MMOL/L
RBC # BLD: ABNORMAL 10*6/UL
SODIUM BLD-SCNC: 142 MMOL/L
TOTAL PROTEIN: 6.1
TRIGL SERPL-MCNC: 84 MG/DL
VLDLC SERPL CALC-MCNC: NORMAL MG/DL
WBC # BLD: 4.3 10^3/ML

## 2020-02-13 ENCOUNTER — OUTSIDE SERVICES (OUTPATIENT)
Dept: INTERNAL MEDICINE CLINIC | Age: 75
End: 2020-02-13

## 2020-02-19 ENCOUNTER — NURSE ONLY (OUTPATIENT)
Dept: INTERNAL MEDICINE CLINIC | Age: 75
End: 2020-02-19
Payer: MEDICARE

## 2020-02-20 LAB
CONTROL: NORMAL
HEMOCCULT STL QL: NORMAL

## 2020-02-20 PROCEDURE — 82274 ASSAY TEST FOR BLOOD FECAL: CPT | Performed by: INTERNAL MEDICINE

## 2020-02-21 DIAGNOSIS — E53.8 VITAMIN B12 DEFICIENCY: ICD-10-CM

## 2020-02-22 LAB
REASON FOR REJECTION: NORMAL
REJECTED TEST: NORMAL

## 2020-02-24 LAB — GASTRIC PARIETAL CELL ANTIBODY: 3.9 UNITS (ref 0–24.9)

## 2020-09-23 ENCOUNTER — OFFICE VISIT (OUTPATIENT)
Dept: INTERNAL MEDICINE CLINIC | Age: 75
End: 2020-09-23
Payer: MEDICARE

## 2020-09-23 VITALS
TEMPERATURE: 97.8 F | SYSTOLIC BLOOD PRESSURE: 132 MMHG | HEART RATE: 56 BPM | HEIGHT: 68 IN | DIASTOLIC BLOOD PRESSURE: 66 MMHG | BODY MASS INDEX: 30.62 KG/M2 | RESPIRATION RATE: 12 BRPM | WEIGHT: 202 LBS

## 2020-09-23 PROCEDURE — G8417 CALC BMI ABV UP PARAM F/U: HCPCS | Performed by: INTERNAL MEDICINE

## 2020-09-23 PROCEDURE — 4040F PNEUMOC VAC/ADMIN/RCVD: CPT | Performed by: INTERNAL MEDICINE

## 2020-09-23 PROCEDURE — 1123F ACP DISCUSS/DSCN MKR DOCD: CPT | Performed by: INTERNAL MEDICINE

## 2020-09-23 PROCEDURE — G8427 DOCREV CUR MEDS BY ELIG CLIN: HCPCS | Performed by: INTERNAL MEDICINE

## 2020-09-23 PROCEDURE — 2022F DILAT RTA XM EVC RTNOPTHY: CPT | Performed by: INTERNAL MEDICINE

## 2020-09-23 PROCEDURE — 3017F COLORECTAL CA SCREEN DOC REV: CPT | Performed by: INTERNAL MEDICINE

## 2020-09-23 PROCEDURE — 99214 OFFICE O/P EST MOD 30 MIN: CPT | Performed by: INTERNAL MEDICINE

## 2020-09-23 PROCEDURE — 1036F TOBACCO NON-USER: CPT | Performed by: INTERNAL MEDICINE

## 2020-09-23 PROCEDURE — 3044F HG A1C LEVEL LT 7.0%: CPT | Performed by: INTERNAL MEDICINE

## 2020-09-23 RX ORDER — LANOLIN ALCOHOL/MO/W.PET/CERES
1000 CREAM (GRAM) TOPICAL DAILY
COMMUNITY

## 2020-09-23 NOTE — PROGRESS NOTES
Anemia/B12 deficiency--Patient is overdue for lab. He is not on any B12 supplements. He did not take the injections. Past Medical History:   Diagnosis Date    CAD in native artery     Carotid stenoses 4/29/2013    20-49% BL    Cataracts, bilateral     Gouty arthritis of left great toe 5/1/2019    Hyperlipidemia     Hypertension     [facility]     Type II or unspecified type diabetes mellitus without mention of complication, not stated as uncontrolled        Medication Sig   metFORMIN (GLUCOPHAGE) 1000 MG tablet TAKE 1 TABLET BY MOUTH TWICE A DAY   tamsulosin (FLOMAX) 0.4 MG capsule TAKE 1 CAPSULE BY MOUTH EVERY DAY   simvastatin (ZOCOR) 40 MG tablet Take 1 tablet by mouth nightly   pioglitazone (ACTOS) 30 MG tablet Take 1 tab QD   metoprolol tartrate (LOPRESSOR) 25 MG tablet TAKE 1 TAB BY MOUTH 2 TIMES DAILY. lisinopril (PRINIVIL;ZESTRIL) 20 MG tablet TAKE 1/2 TABLET BY MOUTH EVERY DAY   Blood Glucose Monitoring Suppl (ONE TOUCH ULTRA 2) W/DEVICE KIT by Does not apply route. ONE TOUCH LANCETS MISC by Does not apply route. aspirin 81 MG EC tablet Take 81 mg by mouth daily. Review of Systems   As per HPI      OBJECTIVE:  Vitals:    09/23/20 1100   BP: 132/66   Pulse: 56   Resp: 12   Temp: 97.8 °F (36.6 °C)   Weight: 202 lb (91.6 kg)   Height: 5' 8\" (1.727 m)     Body mass index is 30.71 kg/m². Wt Readings from Last 3 Encounters:   09/23/20 202 lb (91.6 kg)   01/15/20 192 lb (87.1 kg)   09/04/19 191 lb (86.6 kg)     BP Readings from Last 3 Encounters:   09/23/20 132/66   01/15/20 (!) 122/50   09/04/19 (!) 115/50          GEN: NAD, A&O, obese. Cooperative and pleasant. HEENT:  NC/AT, CYN, EOMI, TM's NL, Anicteric. Oral cavity is clear without mucosal lesions. Throat is NL. Tongue midline. NECK: Supple  No thyromegaly. No thyroid nodules. No JVD. Trachea midline. LYMPH: No C/SC/A nodes  CV: Rhythm is regular. Rate normal.  No murmurs.   No ectopy. PULM: CTA   EXT: + Bilateral lower extremities 1+ pitting edema     GI: Abd. Soft, NT, No masses. No hepatomegaly. NEURO:  No focal or lateralizing deficits. Hernial nerves II through XII are intact  SKIN:  No rashes   VASC: No carotid bruits. Pedal pulses difficult to palpate. ASSESSMENT/PLAN:    1. Type 2 diabetes mellitus with vascular disease ([address]  Diabetes control is uncertain. Overdue for A1c. Encourage patient to consider start checking his blood sugars. Continue to focus on Lifestyle modification including low calorie diet focusing on Low fat/low cholesterol and low carbohydrate intake, along with  increasing cardiovascular (aerobic) exercise. Obtain eye exam report. This will be coming due in December  - CBC Auto Differential; Future  - Comprehensive Metabolic Panel; Future  - Lipid Panel; Future  - TSH without Reflex; Future  - Hemoglobin A1C; Future    2. Type 2 diabetes mellitus with diabetic neuropathy, without long-term current use of insulin (HCC)  As above  - Hemoglobin A1C; Future    3. CAD in native artery  Condition is stable and asymptomatic. Monitor for angina on anginal equivalents. Monitor for fluid overload  Continue current medications. - Comprehensive Metabolic Panel; Future  - Lipid Panel; Future    4. Benign essential HTN  Blood pressure is well controlled  Continue current medications. Continue a no added salt diet. - CBC Auto Differential; Future  - Comprehensive Metabolic Panel; Future  - Lipid Panel; Future  - TSH without Reflex; Future    5. B12 deficiency  Condition is currently treated with oral supplementation. This may not be good enough. Recheck lab  - CBC Auto Differential; Future  - VITAMIN B12; Future    6. Hyperlipidemia, unspecified hyperlipidemia type  Condition stability and control are uncertain. Overdue for lab. Continue statin therapy as part of his overall cardiovascular disease risk reduction strategy.   - Comprehensive Metabolic

## 2020-09-23 NOTE — PATIENT INSTRUCTIONS
To Do List:    #1  Please call your cardiology office and ascertain as to when they want you to get your carotid Doppler. It is overdue. #2 obtain lab test as soon as possible  Patient Education        Vitamin B12 Deficiency: Care Instructions  Overview    A vitamin B12 deficiency means that your body doesn't have enough of this vitamin. You need vitamin B12 to keep red blood cells and nerve cells healthy. Not enough B12 can cause anemia. It can also damage nerves and cause trouble with memory and thinking. Many things can cause low levels of vitamin B12. They include:  · Not getting enough of this vitamin through food. · An autoimmune problem, like pernicious anemia. · Weight-loss surgery, like gastric bypass. · Long-term use of heartburn medicines. Low levels of B12 may not cause symptoms. But symptoms may include fatigue, depression, and thinking or memory problems. You may have tingling in your hands or feet and changes in the way you walk. Treatment depends on the reason for low vitamin B12. Eating more foods rich in B12 may be enough. Or you might take the vitamin as a pill, as shots, or as nasal spray. How can you care for yourself? · Take vitamin B12 as your doctor recommends. · Go to your appointments if you are getting B12 shots. · Eat more foods rich in vitamin B12. Examples are:  ? Animal products. These include meat, seafood, milk products, poultry, and eggs. ? Foods that have B12 added. These are called fortified foods. They include soy products, nutritional yeast, and dry cereals. · Work with a nutritionist or dietitian if you need help getting more vitamin B12 from food. · Talk to your doctor about stopping medicines if they are adding to your B12 deficiency. When should you call for help? Call 911 anytime you think you may need emergency care. For example, call if:  · You passed out (lost consciousness).   Call your doctor now or seek immediate medical care if:  · You are dizzy or lightheaded, or you feel like you may faint. Watch closely for changes in your health. Be sure to call your doctor if:  · You are confused or can't think clearly. · You don't get better as expected. Where can you learn more? Go to [email or web address] org and sign in to your walkbyt account. Enter [phone] in the [employer] box to learn more about \"Vitamin B12 Deficiency: Care Instructions. \"     If you do not have an account, please click on the \"Sign Up Now\" link. Current as of: November 8, 2019               Content Version: 12.5  © 8569-5329 [employer]wise, Incorporated. Care instructions adapted under license by [facility] ([facility]). If you have questions about a medical condition or this instruction, always ask your healthcare professional. [address] any warranty or liability for your use of this information.

## 2021-01-20 ENCOUNTER — OFFICE VISIT (OUTPATIENT)
Dept: INTERNAL MEDICINE CLINIC | Age: 76
End: 2021-01-20
Payer: MEDICARE

## 2021-01-20 VITALS
RESPIRATION RATE: 14 BRPM | SYSTOLIC BLOOD PRESSURE: 142 MMHG | TEMPERATURE: 96.7 F | BODY MASS INDEX: 30.29 KG/M2 | WEIGHT: 199.2 LBS | OXYGEN SATURATION: 98 % | HEART RATE: 59 BPM | DIASTOLIC BLOOD PRESSURE: 60 MMHG

## 2021-01-20 DIAGNOSIS — I65.23 ASYMPTOMATIC BILATERAL CAROTID ARTERY STENOSIS: ICD-10-CM

## 2021-01-20 DIAGNOSIS — E11.59 TYPE 2 DIABETES MELLITUS WITH VASCULAR DISEASE (HCC): ICD-10-CM

## 2021-01-20 DIAGNOSIS — I10 BENIGN ESSENTIAL HTN: ICD-10-CM

## 2021-01-20 DIAGNOSIS — Z12.5 SCREENING FOR PROSTATE CANCER: ICD-10-CM

## 2021-01-20 DIAGNOSIS — I25.10 CAD IN NATIVE ARTERY: ICD-10-CM

## 2021-01-20 DIAGNOSIS — R60.0 BILATERAL LEG EDEMA: ICD-10-CM

## 2021-01-20 DIAGNOSIS — I73.9 PVD (PERIPHERAL VASCULAR DISEASE) (HCC): ICD-10-CM

## 2021-01-20 DIAGNOSIS — E78.5 HYPERLIPIDEMIA, UNSPECIFIED HYPERLIPIDEMIA TYPE: ICD-10-CM

## 2021-01-20 DIAGNOSIS — E53.8 VITAMIN B12 DEFICIENCY: Primary | ICD-10-CM

## 2021-01-20 DIAGNOSIS — I87.2 VENOUS INSUFFICIENCY OF BOTH LOWER EXTREMITIES: ICD-10-CM

## 2021-01-20 PROCEDURE — 1123F ACP DISCUSS/DSCN MKR DOCD: CPT | Performed by: INTERNAL MEDICINE

## 2021-01-20 PROCEDURE — 1036F TOBACCO NON-USER: CPT | Performed by: INTERNAL MEDICINE

## 2021-01-20 PROCEDURE — 4040F PNEUMOC VAC/ADMIN/RCVD: CPT | Performed by: INTERNAL MEDICINE

## 2021-01-20 PROCEDURE — G8484 FLU IMMUNIZE NO ADMIN: HCPCS | Performed by: INTERNAL MEDICINE

## 2021-01-20 PROCEDURE — 2022F DILAT RTA XM EVC RTNOPTHY: CPT | Performed by: INTERNAL MEDICINE

## 2021-01-20 PROCEDURE — G8417 CALC BMI ABV UP PARAM F/U: HCPCS | Performed by: INTERNAL MEDICINE

## 2021-01-20 PROCEDURE — 3046F HEMOGLOBIN A1C LEVEL >9.0%: CPT | Performed by: INTERNAL MEDICINE

## 2021-01-20 PROCEDURE — 3017F COLORECTAL CA SCREEN DOC REV: CPT | Performed by: INTERNAL MEDICINE

## 2021-01-20 PROCEDURE — G8427 DOCREV CUR MEDS BY ELIG CLIN: HCPCS | Performed by: INTERNAL MEDICINE

## 2021-01-20 PROCEDURE — 99214 OFFICE O/P EST MOD 30 MIN: CPT | Performed by: INTERNAL MEDICINE

## 2021-01-20 RX ORDER — SIMVASTATIN 40 MG
40 TABLET ORAL NIGHTLY
Qty: 90 TABLET | Refills: 3 | Status: SHIPPED | OUTPATIENT
Start: 2021-01-20 | End: 2022-01-26

## 2021-01-20 RX ORDER — TAMSULOSIN HYDROCHLORIDE 0.4 MG/1
CAPSULE ORAL
Qty: 90 CAPSULE | Refills: 3 | Status: SHIPPED | OUTPATIENT
Start: 2021-01-20 | End: 2022-01-26

## 2021-01-20 RX ORDER — PIOGLITAZONEHYDROCHLORIDE 30 MG/1
TABLET ORAL
Qty: 90 TABLET | Refills: 3 | Status: SHIPPED | OUTPATIENT
Start: 2021-01-20 | End: 2022-01-26

## 2021-01-20 RX ORDER — LISINOPRIL 20 MG/1
TABLET ORAL
Qty: 90 TABLET | Refills: 3 | Status: SHIPPED | OUTPATIENT
Start: 2021-01-20 | End: 2022-01-26

## 2021-01-20 ASSESSMENT — PATIENT HEALTH QUESTIONNAIRE - PHQ9
SUM OF ALL RESPONSES TO PHQ QUESTIONS 1-9: 0
1. LITTLE INTEREST OR PLEASURE IN DOING THINGS: 0
2. FEELING DOWN, DEPRESSED OR HOPELESS: 0

## 2021-01-20 NOTE — PROGRESS NOTES
[facility]) Physicians  Internal Medicine  Patient Encounter  Vir[facility]agland D.O., [facility]           Chief Complaint   Patient presents with    Follow-up       HPI: 76 y.o. male seen today requesting his routine checkup regarding the status of current chronic medical problems as below along with a medication review and reconciliation. Patient has a known history of type 2 diabetes with neuropathy and peripheral vascular disease, hypertension, coronary artery disease, hyperlipidemia, carotid stenosis, B12 deficiency, anemia, carotid atherosclerosis with stenosis    Patient is overdue for his diabetic foot exam.  Other other health maintenance items that are overdue include his vaccinations which he has refused including Tdap, flu, Shingrix, COVID-19. He will be due for his FIT for colon cancer screening  Patient did not obtain his lab test ordered on 9/23/2020. His cardiology visit with Dr. Anna Whyte on 6/24/2020 was reviewed in the electronic health record. His last carotid Doppler was done on 8/5/2019. This was also reviewed. The left internal carotid artery measured 50 to 69% stenosis. The right internal carotid artery measured less than 50% stenosis. He has a follow up with Dr. Anna Whyte in June 2021. He orders the carotid doppler. He denies CP, SOB. He does have swelling by the end of the day, better by morning. He denies orthopnea. He denies N/V/D. He denies hematochezia, melena. He denies unilateral weakness, paresthesias, speech or vision changes. He had his eye exam 12/11/2020. He does not check his sugars as usual.  He denies any new claudication or foot wounds.     Lab Results   Component Value Date    LABA1C 6.8 01/21/2020     Lab Results   Component Value Date    .6 09/04/2019        Past Medical History:   Diagnosis Date    CAD in native artery     Carotid stenoses 4/29/2013    20-49% BL    Cataracts, bilateral     Gouty arthritis of left great toe 5/1/2019    Hyperlipidemia     Hypertension     [facility]per[facility]) [facility]     Type II or unspecified type diabetes mellitus without mention of complication, not stated as uncontrolled     Vitamin B12 deficiency 1/20/2021     MEDICATIONS:  Medication Sig   vitamin B-12 (CYANOCOBALAMIN) 1000 MCG tablet Take 1,000 mcg by mouth daily   metFORMIN (GLUCOPHAGE) 1000 MG tablet TAKE 1 TABLET BY MOUTH TWICE A DAY   tamsulosin (FLOMAX) 0.4 MG capsule TAKE 1 CAPSULE BY MOUTH EVERY DAY   simvastatin (ZOCOR) 40 MG tablet Take 1 tablet by mouth nightly   pioglitazone (ACTOS) 30 MG tablet Take 1 tab QD   metoprolol tartrate (LOPRESSOR) 25 MG tablet TAKE 1 TAB BY MOUTH 2 TIMES DAILY. lisinopril (PRINIVIL;ZESTRIL) 20 MG tablet TAKE 1/2 TABLET BY MOUTH EVERY DAY   ONE TOUCH LANCETS MISC by Does not apply route. aspirin 81 MG EC tablet Take 81 mg by mouth daily. Review of Systems   As per HPI      OBJECTIVE:  Vitals:    01/20/21 0938 01/20/21 1025   BP: (!) 142/60 (!) 142/60   Pulse: 59    Resp: 14    Temp: 96.7 °F (35.9 °C)    TempSrc: Temporal    SpO2: 98%    Weight: 199 lb 3.2 oz (90.4 kg)      Body mass index is 30.29 kg/m². Wt Readings from Last 3 Encounters:   01/20/21 199 lb 3.2 oz (90.4 kg)   09/23/20 202 lb (91.6 kg)   01/15/20 192 lb (87.1 kg)     BP Readings from Last 3 Encounters:   01/20/21 (!) 142/60   09/23/20 132/66   01/15/20 (!) 122/50          GEN: NAD, A&O, obese. Cooperative and pleasant. HEENT:  NC/AT, CYN, EOMI, TM's NL, Anicteric. Oral cavity is clear without mucosal lesions. Throat is NL. Tongue midline. NECK: Supple no JVD. No thyromegaly. LYMPH: No apical or supraclavicular lymphadenopathy  CV: Rhythm is regular. Rate normal.  No murmurs. No ectopy. PULM: CTA   EXT: Trace bilateral lower extremity edema. Seems improved compared to previous exams  GI: Abd. Soft, NT, No masses. No hepatomegaly.   No splenomegaly  NEURO: Monofilament and vibratory sensation diminished in both feet  SKIN:  No rashes. Feet normal color and temperature, no large calluses, ulcers or wounds. Feet are warm  VASC: No carotid bruits. Pedal pulses difficult to palpate. ASSESSMENT/PLAN:    1. Type 2 diabetes, uncontrolled, with neuropathy ([facility]  Diabetes control is uncertain. He does not check his blood sugars despite my recommendation to do so. Patient was counseled on the need to engage in more physical activity and to keep working on his diet. He was educated on carbohydrate restriction. Patient will continue on Metformin and pioglitazone. He has been resistant to starting any of the newer medications. Foot exam today  - metFORMIN (GLUCOPHAGE) 1000 MG tablet; Take 1 tablet by mouth 2 times daily (with meals)  Dispense: 180 tablet; Refill: 3  - pioglitazone (ACTOS) 30 MG tablet; Take 1 tab QD  Dispense: 90 tablet; Refill: 3  - Comprehensive Metabolic Panel; Future  - Lipid Panel; Future  - Hemoglobin A1C; Future    2. Type 2 diabetes mellitus with vascular disease ([facility]  As above  Patient should have a vascular study of his lower extremities with LOU measurements. Having difficulty palpating his pulses today  Patient states he will have his cardiologist check this. - metFORMIN (GLUCOPHAGE) 1000 MG tablet; Take 1 tablet by mouth 2 times daily (with meals)  Dispense: 180 tablet; Refill: 3  - pioglitazone (ACTOS) 30 MG tablet; Take 1 tab QD  Dispense: 90 tablet; Refill: 3  - Comprehensive Metabolic Panel; Future  - Lipid Panel; Future  - Hemoglobin A1C; Future    3. Vitamin B12 deficiency  Condition stability and control are uncertain  Due for lab  - CBC Auto Differential; Future  - Vitamin B12 & Folate; Future    4. Benign essential HTN  Blood pressure is uncontrolled  Increase lisinopril to 20 mg daily from 10 mg daily  Monitor blood pressure at home. Monitor for hypotension.  - metoprolol tartrate (LOPRESSOR) 25 MG tablet; TAKE 1 TAB BY MOUTH 2 TIMES DAILY. Dispense: 180 tablet;  Refill: 3  - lisinopril (PRINIVIL;ZESTRIL) 20 MG tablet; TAKE 1 TABLET BY MOUTH EVERY DAY  Dispense: 90 tablet; Refill: 3  - CBC Auto Differential; Future  - Comprehensive Metabolic Panel; Future  - Lipid Panel; Future  - TSH without Reflex; Future    5. CAD in native artery  Condition is stable without signs of angina, anginal equivalents, cardiac decompensation. Continue to monitor for the above symptoms. Continue simvastatin. Refill medication  - simvastatin (ZOCOR) 40 MG tablet; Take 1 tablet by mouth nightly  Dispense: 90 tablet; Refill: 3  - metoprolol tartrate (LOPRESSOR) 25 MG tablet; TAKE 1 TAB BY MOUTH 2 TIMES DAILY. Dispense: 180 tablet; Refill: 3  - Comprehensive Metabolic Panel; Future  - Lipid Panel; Future    6. Bilateral leg edema  Patient has trace edema today. Continue to monitor for increased edema. Counseled on a no added salt diet  - Lipid Panel; Future    7. Asymptomatic bilateral carotid artery stenosis  Disease has been progressive over the years  Patient remains asymptomatic  Continue simvastatin, aspirin  Patient due for carotid Doppler. His cardiologist handles ordering this study  - Comprehensive Metabolic Panel; Future  - Lipid Panel; Future    8. Hyperlipidemia, unspecified hyperlipidemia type  Condition stability and control are uncertain  Due for lab  Continue statin while watching for adverse side effects such as myalgias  - simvastatin (ZOCOR) 40 MG tablet; Take 1 tablet by mouth nightly  Dispense: 90 tablet; Refill: 3    9. Venous insufficiency of both lower extremities  Condition is stable and well-controlled    10. PVD (peripheral vascular disease) ([address]  Based solely on physical exam  He has decreased pulses in his feet  Recommend LOU measurement    11. Screening for prostate cancer    - Psa screening;  Future        Patient provided a FIT kit for colon cancer screening

## 2021-01-29 LAB
A/G RATIO: NORMAL
ALBUMIN SERPL-MCNC: 3.8 G/DL
ALBUMIN SERPL-MCNC: 3.9 G/DL
ALP BLD-CCNC: 67 U/L
ALT SERPL-CCNC: 10 U/L
AST SERPL-CCNC: 15 U/L
AVERAGE GLUCOSE: NORMAL
BASOPHILS ABSOLUTE: 19 /ΜL
BASOPHILS RELATIVE PERCENT: 0.4 %
BILIRUB SERPL-MCNC: 0.6 MG/DL (ref 0.1–1.4)
BILIRUBIN DIRECT: NORMAL
BILIRUBIN, INDIRECT: NORMAL
BUN / CREAT RATIO: NORMAL
BUN BLDV-MCNC: 24 MG/DL
CALCIUM SERPL-MCNC: 10.6 MG/DL
CHLORIDE BLD-SCNC: 107 MMOL/L
CO2: 27 MMOL/L
CREAT SERPL-MCNC: 1.26 MG/DL
EOSINOPHILS ABSOLUTE: 85 /ΜL
EOSINOPHILS RELATIVE PERCENT: 1.8 %
FOLATE: 12
GLOBULIN: NORMAL
GLUCOSE: 139
HBA1C MFR BLD: 7.3 %
HCT VFR BLD CALC: 37.2 % (ref 41–53)
HEMOGLOBIN: 12.3 G/DL (ref 13.5–17.5)
LYMPHOCYTES ABSOLUTE: 1908 /ΜL
LYMPHOCYTES RELATIVE PERCENT: 40.6 %
MCH RBC QN AUTO: 30.8 PG
MCHC RBC AUTO-ENTMCNC: 33.2 G/DL
MCV RBC AUTO: 93 FL
MONOCYTES ABSOLUTE: 404 /ΜL
MONOCYTES RELATIVE PERCENT: 8.6 %
NEUTROPHILS ABSOLUTE: 2284 /ΜL
NEUTROPHILS RELATIVE PERCENT: 48.6 %
PHOSPHORUS: NORMAL
PLATELET # BLD: 172 K/ΜL
PMV BLD AUTO: 8.2 FL
POTASSIUM SERPL-SCNC: 5 MMOL/L
PROSTATE SPECIFIC ANTIGEN: 0.46 NG/ML
PROTEIN TOTAL: 6.5 G/DL
RBC # BLD: 4 10^6/ΜL
SODIUM BLD-SCNC: 140 MMOL/L
TSH SERPL DL<=0.05 MIU/L-ACNC: 3.25 UIU/ML
VITAMIN B-12: 446
WBC # BLD: 4.7 10^3/ML

## 2021-03-26 ENCOUNTER — NURSE ONLY (OUTPATIENT)
Dept: INTERNAL MEDICINE CLINIC | Age: 76
End: 2021-03-26
Payer: MEDICARE

## 2021-03-26 DIAGNOSIS — Z12.12 SCREENING FOR MALIGNANT NEOPLASM OF THE RECTUM: Primary | ICD-10-CM

## 2021-03-26 LAB
CONTROL: NORMAL
HEMOCCULT STL QL: NORMAL

## 2021-03-26 PROCEDURE — 82274 ASSAY TEST FOR BLOOD FECAL: CPT | Performed by: INTERNAL MEDICINE

## 2021-05-19 ENCOUNTER — OFFICE VISIT (OUTPATIENT)
Dept: INTERNAL MEDICINE CLINIC | Age: 76
End: 2021-05-19
Payer: MEDICARE

## 2021-05-19 VITALS
WEIGHT: 198.4 LBS | HEART RATE: 62 BPM | BODY MASS INDEX: 30.17 KG/M2 | DIASTOLIC BLOOD PRESSURE: 68 MMHG | SYSTOLIC BLOOD PRESSURE: 132 MMHG | OXYGEN SATURATION: 98 % | RESPIRATION RATE: 14 BRPM

## 2021-05-19 DIAGNOSIS — I65.23 ASYMPTOMATIC BILATERAL CAROTID ARTERY STENOSIS: ICD-10-CM

## 2021-05-19 DIAGNOSIS — I73.9 PVD (PERIPHERAL VASCULAR DISEASE) (HCC): ICD-10-CM

## 2021-05-19 DIAGNOSIS — E78.5 HYPERLIPIDEMIA, UNSPECIFIED HYPERLIPIDEMIA TYPE: ICD-10-CM

## 2021-05-19 DIAGNOSIS — E11.59 TYPE 2 DIABETES MELLITUS WITH VASCULAR DISEASE (HCC): ICD-10-CM

## 2021-05-19 DIAGNOSIS — Z79.899 DRUG THERAPY: ICD-10-CM

## 2021-05-19 DIAGNOSIS — E53.8 B12 DEFICIENCY: ICD-10-CM

## 2021-05-19 DIAGNOSIS — I10 BENIGN ESSENTIAL HTN: ICD-10-CM

## 2021-05-19 DIAGNOSIS — I25.10 CAD IN NATIVE ARTERY: ICD-10-CM

## 2021-05-19 DIAGNOSIS — I87.2 VENOUS INSUFFICIENCY OF BOTH LOWER EXTREMITIES: ICD-10-CM

## 2021-05-19 LAB
BILIRUBIN, POC: NEGATIVE
BLOOD URINE, POC: NEGATIVE
CLARITY, POC: CLEAR
COLOR, POC: YELLOW
CREATININE URINE: 72.9 MG/DL (ref 39–259)
GLUCOSE URINE, POC: NEGATIVE
KETONES, POC: NEGATIVE
LEUKOCYTE EST, POC: NEGATIVE
MICROALBUMIN UR-MCNC: <1.2 MG/DL
MICROALBUMIN/CREAT UR-RTO: NORMAL MG/G (ref 0–30)
NITRITE, POC: NEGATIVE
PH, POC: 6
PROTEIN, POC: NEGATIVE
SPECIFIC GRAVITY, POC: 1.02
UROBILINOGEN, POC: 0.2

## 2021-05-19 PROCEDURE — 3017F COLORECTAL CA SCREEN DOC REV: CPT | Performed by: INTERNAL MEDICINE

## 2021-05-19 PROCEDURE — 1123F ACP DISCUSS/DSCN MKR DOCD: CPT | Performed by: INTERNAL MEDICINE

## 2021-05-19 PROCEDURE — G8417 CALC BMI ABV UP PARAM F/U: HCPCS | Performed by: INTERNAL MEDICINE

## 2021-05-19 PROCEDURE — G8427 DOCREV CUR MEDS BY ELIG CLIN: HCPCS | Performed by: INTERNAL MEDICINE

## 2021-05-19 PROCEDURE — 1036F TOBACCO NON-USER: CPT | Performed by: INTERNAL MEDICINE

## 2021-05-19 PROCEDURE — 4040F PNEUMOC VAC/ADMIN/RCVD: CPT | Performed by: INTERNAL MEDICINE

## 2021-05-19 PROCEDURE — 99214 OFFICE O/P EST MOD 30 MIN: CPT | Performed by: INTERNAL MEDICINE

## 2021-05-19 PROCEDURE — 2022F DILAT RTA XM EVC RTNOPTHY: CPT | Performed by: INTERNAL MEDICINE

## 2021-05-19 PROCEDURE — 81002 URINALYSIS NONAUTO W/O SCOPE: CPT | Performed by: INTERNAL MEDICINE

## 2021-05-19 PROCEDURE — 3051F HG A1C>EQUAL 7.0%<8.0%: CPT | Performed by: INTERNAL MEDICINE

## 2021-05-19 NOTE — PATIENT INSTRUCTIONS
To do list:      #1 continue efforts with Lifestyle modification including low calorie diet focusing on Low fat/low cholesterol and low carbohydrate intake, along with  increasing cardiovascular (aerobic) exercise. I had like to see the A1c get a little lower (below 7%)    #2 have your cardiologist evaluate your leg circulation. #3 you are due for your carotid Doppler. Your cardiologist usually orders this    #4 obtain your lab tests.

## 2021-05-19 NOTE — PROGRESS NOTES
[facility]) Physicians  Internal Medicine  Patient Encounter  Ramiro Roberson D.O., [facility]           Chief Complaint   Patient presents with    Follow-up       HPI: 76 y.o. male with multiple medical problems seen today regarding the status of his current chronic medical problems below along with a medication review and reconciliation. He states he injured the right little toe 2 months ago. The toe was black and blue. Symptoms resolved. Preventative healthcare items:  Patient refuses flu vaccine, shingles vaccine, COVID-19 vaccine, Medicare annual wellness visit. Diabetes Mellitus Type II, Follow-up--   Lab Results   Component Value Date    LABA1C 7.3 01/29/2021   He continues to avoid checking sugars. He denies new dysesthesias in the feet. He has gained some weight. He has been eating a lot during the pandemic. He has not been walking as much. Last Eye Exam-- 12/11/2020  U. Microalbumin/Cr-- 1/15/2020, Due  Pt is on ACEI --Lisinopril. +ASA 81 mg daily   Complications--Neuropathy, PVD. No tobacco use  LDL--61  + Statin    HTN--  He denies lightheadedness, dizziness, syncope. He denies unilateral weakness or paresthesias. Hyperlipidemia:  He has been on Zocor for years. He denies myalgias or weakness. Lab Results   Component Value Date    [address] 61 01/21/2020      CAD-- Previous history----->  H/O CABG 10/2011. Angiogram 4/29/2013. He sees Dr. Nakia Christopher. He plays golf. He walks the course without difficulty. He denies CP, SOB, orthopnea, increased swelling. He has a F/U with Dr. Nakia Christopher 6/28/2021. Carotid stenosis-- Previous history----> Last carotid doppler 8/5/2019--- <50% right, 50-69% Left. Dr. Nakia Christopher is handling. His doppler was due this year. PVD/venous insufficiency-- He denies more swelling than usual.  He denies claudication. Anemia/B12 deficiency--Patient is overdue for lab. He is not on any B12 supplements.   He did not take the injections. Past Medical History:   Diagnosis Date    CAD in native artery     Carotid stenoses 4/29/2013    20-49% BL    Cataracts, bilateral     Gouty arthritis of left great toe 5/1/2019    Hyperlipidemia     Hypertension     [facility]VD [facility]per[facility] disease) [facility] [facility]     Type II or unspecified type diabetes mellitus without mention of complication, not stated as uncontrolled     Vitamin B12 deficiency 1/20/2021       Medication Sig   metFORMIN (GLUCOPHAGE) 1000 MG tablet TAKE 1 TABLET BY MOUTH TWICE A DAY   tamsulosin (FLOMAX) 0.4 MG capsule TAKE 1 CAPSULE BY MOUTH EVERY DAY   simvastatin (ZOCOR) 40 MG tablet Take 1 tablet by mouth nightly   pioglitazone (ACTOS) 30 MG tablet Take 1 tab QD   metoprolol tartrate (LOPRESSOR) 25 MG tablet TAKE 1 TAB BY MOUTH 2 TIMES DAILY. lisinopril (PRINIVIL;ZESTRIL) 20 MG tablet TAKE 1/2 TABLET BY MOUTH EVERY DAY   Blood Glucose Monitoring Suppl (ONE TOUCH ULTRA 2) W/DEVICE KIT by Does not apply route. ONE TOUCH LANCETS MISC by Does not apply route. aspirin 81 MG EC tablet Take 81 mg by mouth daily. Review of Systems   As per HPI      OBJECTIVE:  Vitals:    05/19/21 0942   BP: 132/68   Pulse: 62   Resp: 14   SpO2: 98%   Weight: 198 lb 6.4 oz (90 kg)     Body mass index is 30.17 kg/m². Wt Readings from Last 3 Encounters:   05/19/21 198 lb 6.4 oz (90 kg)   01/20/21 199 lb 3.2 oz (90.4 kg)   09/23/20 202 lb (91.6 kg)     BP Readings from Last 3 Encounters:   05/19/21 132/68   01/20/21 (!) 142/60   09/23/20 132/66          GEN: NAD, A&O, obese. Pleasant. Overweight. HEENT:  NC/AT, CYN, EOMI, TM's NL, anicetric  NECK: Supple  No thyromegaly. No thyroid nodules. No JVD. Trachea midline. LYMPH: No C/SC/A nodes  CV: Rhythm is regular. Rate normal.  No murmurs. No ectopy. PULM: CTA   EXT: Trace bilateral lower extremity edema. GI: Abd. Soft, NT, No masses. No hepatomegaly. NEURO:  No focal or lateralizing deficits. No ataxia.  Moves all pulses  He will see his cardiologist in the coming weeks. Continue to monitor for claudication  - Lipid Panel; Future    8. CAD in native artery  Condition is stable and asymptomatic. No signs of angina or anginal equivalents  Continue to monitor  Continue current medications    9. B12 deficiency  Recheck level  Back in February 2020 his B12 level was undetectable. Lab for intrinsic factor blocking antibodies was not run as the specimen was rejected.   We will continue to monitor for recurrent B12 deficiency  - VITAMIN B12; Future

## 2021-06-21 LAB
AVERAGE GLUCOSE: NORMAL
HBA1C MFR BLD: 7.1 %

## 2021-09-15 ENCOUNTER — OFFICE VISIT (OUTPATIENT)
Dept: INTERNAL MEDICINE CLINIC | Age: 76
End: 2021-09-15
Payer: MEDICARE

## 2021-09-15 VITALS
SYSTOLIC BLOOD PRESSURE: 136 MMHG | HEART RATE: 64 BPM | WEIGHT: 194 LBS | DIASTOLIC BLOOD PRESSURE: 50 MMHG | BODY MASS INDEX: 29.5 KG/M2

## 2021-09-15 DIAGNOSIS — I65.23 ASYMPTOMATIC BILATERAL CAROTID ARTERY STENOSIS: Primary | ICD-10-CM

## 2021-09-15 DIAGNOSIS — I25.10 CAD IN NATIVE ARTERY: ICD-10-CM

## 2021-09-15 DIAGNOSIS — E11.40 TYPE 2 DIABETES MELLITUS WITH DIABETIC NEUROPATHY, WITHOUT LONG-TERM CURRENT USE OF INSULIN (HCC): ICD-10-CM

## 2021-09-15 DIAGNOSIS — E53.8 VITAMIN B12 DEFICIENCY: ICD-10-CM

## 2021-09-15 DIAGNOSIS — I10 BENIGN ESSENTIAL HTN: ICD-10-CM

## 2021-09-15 PROCEDURE — 3017F COLORECTAL CA SCREEN DOC REV: CPT | Performed by: INTERNAL MEDICINE

## 2021-09-15 PROCEDURE — G8417 CALC BMI ABV UP PARAM F/U: HCPCS | Performed by: INTERNAL MEDICINE

## 2021-09-15 PROCEDURE — 4040F PNEUMOC VAC/ADMIN/RCVD: CPT | Performed by: INTERNAL MEDICINE

## 2021-09-15 PROCEDURE — 1123F ACP DISCUSS/DSCN MKR DOCD: CPT | Performed by: INTERNAL MEDICINE

## 2021-09-15 PROCEDURE — 3051F HG A1C>EQUAL 7.0%<8.0%: CPT | Performed by: INTERNAL MEDICINE

## 2021-09-15 PROCEDURE — 2022F DILAT RTA XM EVC RTNOPTHY: CPT | Performed by: INTERNAL MEDICINE

## 2021-09-15 PROCEDURE — G8427 DOCREV CUR MEDS BY ELIG CLIN: HCPCS | Performed by: INTERNAL MEDICINE

## 2021-09-15 PROCEDURE — 99214 OFFICE O/P EST MOD 30 MIN: CPT | Performed by: INTERNAL MEDICINE

## 2021-09-15 PROCEDURE — 1036F TOBACCO NON-USER: CPT | Performed by: INTERNAL MEDICINE

## 2021-09-15 NOTE — PROGRESS NOTES
[facility]) Physicians  Internal Medicine  Patient Encounter  Asya Mendez D.O., [facility]           Chief Complaint   Patient presents with    Follow-up       HPI: 76 y.o. male with multiple medical problems seen today regarding the status of his current chronic medical problems below along with a medication review and reconciliation. Patient was noncompliant in obtaining the lab work that was ordered back in May 2021. Patient was seen in the emergency department on 7/24/2021 and diagnosed with a facial cellulitis due to tooth infection. He is due to see the dentist.  He is going to have teeth all pulled. Preventative healthcare items:  Patient refuses flu vaccine, shingles vaccine, COVID-19 vaccine, Medicare annual wellness visit. Literature provided on COVID-19 vaccine      Diabetes Mellitus Type II, Follow-up--   Lab Results   Component Value Date    LABA1C 7.3 01/29/2021   Been in his A1c, his last A1c crept up above 7% again. He did not obtain the lab work that was ordered in May. He states he has been very active with yard work. Last Eye Exam-- 12/11/2020  U. Microalbumin/Cr--5/19/2021  Pt is on ACEI --Lisinopril. +ASA 81 mg daily   Complications--Neuropathy, PVD. No tobacco use  LDL--61  + Statin    HTN--patient endorses no symptoms suggestive of accelerated blood pressures. He denies any headaches, lightheadedness, syncope. He denies any episodes of unilateral weakness, speech or visual disturbances. Hyperlipidemia: Patient remains on statin therapy. He endorses no adverse side effects such as myalgias or muscle cramping. Lab Results   Component Value Date    [address] 01/21/2020      CAD-- Previous history----->  H/O CABG 10/2011. Angiogram 4/29/2013. He sees Dr. Virginia Frost. He was last seen on 6/20/2021. No changes were made. Patient endorses no symptoms of angina or anginal equivalents. He has had no orthopnea.   He does have some swelling but no worse than usual.  Patient is able to stay physically active but does not exert himself much. He does play golf without difficulties. He had recommended a repeat carotid Doppler. Carotid stenosis-- Previous history----> Last carotid doppler 8/5/2019--- <50% right, 50-69% Left. Dr. Michaela Jeronimo is handling. It was recommended he repeat the carotid Doppler at his last cardiology visit. PVD/venous insufficiency--Patient does have swelling that seems to worsen as the day progresses. Swelling is better by morning. He denies any claudication. Anemia/B12 deficiency--Patient is overdue for lab. He is not on any B12 supplements. He did not take the injections. Past Medical History:   Diagnosis Date    CAD in native artery     Carotid stenoses 4/29/2013    20-49% BL    Cataracts, bilateral     Gouty arthritis of left great toe 5/1/2019    Hyperlipidemia     Hypertension     PVD [facility]peripheral vascular disease) ([facility] [address]     Type II or unspecified type diabetes mellitus without mention of complication, not stated as uncontrolled     Vitamin B12 deficiency 1/20/2021       MEDICATIONS:  Medication Sig   metFORMIN (GLUCOPHAGE) 1000 MG tablet Take 1 tablet by mouth 2 times daily (with meals)   tamsulosin (FLOMAX) 0.4 MG capsule TAKE 1 CAPSULE BY MOUTH EVERY DAY   simvastatin (ZOCOR) 40 MG tablet Take 1 tablet by mouth nightly   pioglitazone (ACTOS) 30 MG tablet Take 1 tab QD   metoprolol tartrate (LOPRESSOR) 25 MG tablet TAKE 1 TAB BY MOUTH 2 TIMES DAILY. lisinopril (PRINIVIL;ZESTRIL) 20 MG tablet TAKE 1 TABLET BY MOUTH EVERY DAY   vitamin B-12 (CYANOCOBALAMIN) 1000 MCG tablet Take 1,000 mcg by mouth daily   aspirin 81 MG EC tablet Take 81 mg by mouth daily. ONE TOUCH LANCETS MISC by Does not apply route. Review of Systems   As per HPI      OBJECTIVE:  Vitals:    09/15/21 0802   BP: (!) 136/50   Pulse: 64   Weight: 194 lb (88 kg)     Body mass index is 29.5 kg/m².      Wt Readings from Last 3 Encounters: 09/15/21 194 lb (88 kg)   05/19/21 198 lb 6.4 oz (90 kg)   01/20/21 199 lb 3.2 oz (90.4 kg)     BP Readings from Last 3 Encounters:   09/15/21 (!) 136/50   05/19/21 132/68   01/20/21 (!) 142/60          GEN: NAD, A&O, obese. Pleasant. Overweight. HEENT:  NC/AT, CYN, EOMI, TM's NL, anicetric  NECK: Supple  No thyromegaly. No thyroid nodules. No JVD. Trachea midline. LYMPH: No C/SC/A nodes  CV: Rhythm is regular. Rate normal.  No murmurs. No ectopy. PULM: CTA   EXT: Trace bilateral lower extremity edema. GI: Abd. Soft, NT, No masses. No hepatomegaly. NEURO:  No focal or lateralizing deficits. No ataxia. Moves all extremities  SKIN:  No rashes   VASC: No carotid bruits. Unable to palpate pedal pulses. Feet slightly cool. ASSESSMENT/PLAN:    1. Asymptomatic bilateral carotid artery stenosis  Condition stability is uncertain  Last carotid Doppler did show some progressive disease  Overdue for repeat Doppler  Doppler ordered. This was also recommended by his cardiologist  - GREGG VALDEZ CAROTID BILATERAL; Future  - Lipid Panel; Future    2. Benign essential HTN  Blood pressure is well controlled  Continue current medication  - CBC Auto Differential; Future  - Comprehensive Metabolic Panel; Future  - Lipid Panel; Future    3. CAD in native artery  Condition is stable without signs of angina or anginal equivalents. Continue efforts with regular exercise and a healthy low-fat diet. - Comprehensive Metabolic Panel; Future  - Lipid Panel; Future    4. Vitamin B12 deficiency  Condition control is uncertain  Recheck lab  - Vitamin B12 & Folate; Future    5. Type 2 diabetes mellitus with diabetic neuropathy, without long-term current use of insulin (HCC)  Diabetes is uncontrolled based on last A1c level  Continue current medications  If A1c remains above 7%, consider adding a third medication.   Patient has been resistant to adding medications in the past.  Continue efforts with Lifestyle modification including low calorie diet focusing on Low fat/low cholesterol and low carbohydrate intake, along with  increasing cardiovascular (aerobic) exercise. - CBC Auto Differential; Future  - Comprehensive Metabolic Panel; Future  - Lipid Panel; Future  - Hemoglobin A1C; Future  - Vitamin B12 & Folate;  Future

## 2021-12-21 DIAGNOSIS — I65.22 LEFT CAROTID ARTERY STENOSIS: Primary | ICD-10-CM

## 2022-01-26 DIAGNOSIS — I10 BENIGN ESSENTIAL HTN: ICD-10-CM

## 2022-01-26 DIAGNOSIS — I25.10 CAD IN NATIVE ARTERY: ICD-10-CM

## 2022-01-26 DIAGNOSIS — E11.59 TYPE 2 DIABETES MELLITUS WITH VASCULAR DISEASE (HCC): ICD-10-CM

## 2022-01-26 DIAGNOSIS — E78.5 HYPERLIPIDEMIA, UNSPECIFIED HYPERLIPIDEMIA TYPE: ICD-10-CM

## 2022-01-26 RX ORDER — LISINOPRIL 20 MG/1
TABLET ORAL
Qty: 90 TABLET | Refills: 3 | Status: SHIPPED | OUTPATIENT
Start: 2022-01-26

## 2022-01-26 RX ORDER — PIOGLITAZONEHYDROCHLORIDE 30 MG/1
TABLET ORAL
Qty: 90 TABLET | Refills: 3 | Status: SHIPPED | OUTPATIENT
Start: 2022-01-26

## 2022-01-26 RX ORDER — TAMSULOSIN HYDROCHLORIDE 0.4 MG/1
CAPSULE ORAL
Qty: 90 CAPSULE | Refills: 3 | Status: SHIPPED | OUTPATIENT
Start: 2022-01-26

## 2022-01-26 RX ORDER — SIMVASTATIN 40 MG
TABLET ORAL
Qty: 90 TABLET | Refills: 3 | Status: SHIPPED | OUTPATIENT
Start: 2022-01-26

## 2022-01-26 NOTE — TELEPHONE ENCOUNTER
Last appointment: 9/15/2021  Next appointment: Visit date not found  Last refill: 1/20/2021  Sent [employer] message to schedule due/overdue appointment.

## 2022-02-26 ENCOUNTER — OFFICE VISIT (OUTPATIENT)
Dept: INTERNAL MEDICINE CLINIC | Age: 77
End: 2022-02-26
Payer: MEDICARE

## 2022-02-26 VITALS
RESPIRATION RATE: 14 BRPM | SYSTOLIC BLOOD PRESSURE: 136 MMHG | HEIGHT: 68 IN | WEIGHT: 194 LBS | OXYGEN SATURATION: 99 % | HEART RATE: 64 BPM | BODY MASS INDEX: 29.4 KG/M2 | DIASTOLIC BLOOD PRESSURE: 62 MMHG

## 2022-02-26 DIAGNOSIS — E78.5 HYPERLIPIDEMIA, UNSPECIFIED HYPERLIPIDEMIA TYPE: ICD-10-CM

## 2022-02-26 DIAGNOSIS — I25.10 CAD IN NATIVE ARTERY: ICD-10-CM

## 2022-02-26 DIAGNOSIS — I65.23 ASYMPTOMATIC BILATERAL CAROTID ARTERY STENOSIS: ICD-10-CM

## 2022-02-26 DIAGNOSIS — Z23 NEED FOR TDAP VACCINATION: ICD-10-CM

## 2022-02-26 DIAGNOSIS — E11.59 TYPE 2 DIABETES MELLITUS WITH VASCULAR DISEASE (HCC): ICD-10-CM

## 2022-02-26 DIAGNOSIS — I10 BENIGN ESSENTIAL HTN: Primary | ICD-10-CM

## 2022-02-26 DIAGNOSIS — E11.40 TYPE 2 DIABETES MELLITUS WITH DIABETIC NEUROPATHY, WITHOUT LONG-TERM CURRENT USE OF INSULIN (HCC): ICD-10-CM

## 2022-02-26 DIAGNOSIS — I73.9 PVD (PERIPHERAL VASCULAR DISEASE) (HCC): ICD-10-CM

## 2022-02-26 DIAGNOSIS — E53.8 VITAMIN B12 DEFICIENCY: ICD-10-CM

## 2022-02-26 DIAGNOSIS — I87.2 VENOUS INSUFFICIENCY OF BOTH LOWER EXTREMITIES: ICD-10-CM

## 2022-02-26 PROCEDURE — G8417 CALC BMI ABV UP PARAM F/U: HCPCS | Performed by: INTERNAL MEDICINE

## 2022-02-26 PROCEDURE — 1123F ACP DISCUSS/DSCN MKR DOCD: CPT | Performed by: INTERNAL MEDICINE

## 2022-02-26 PROCEDURE — 99214 OFFICE O/P EST MOD 30 MIN: CPT | Performed by: INTERNAL MEDICINE

## 2022-02-26 PROCEDURE — G8427 DOCREV CUR MEDS BY ELIG CLIN: HCPCS | Performed by: INTERNAL MEDICINE

## 2022-02-26 PROCEDURE — 1036F TOBACCO NON-USER: CPT | Performed by: INTERNAL MEDICINE

## 2022-02-26 PROCEDURE — 4040F PNEUMOC VAC/ADMIN/RCVD: CPT | Performed by: INTERNAL MEDICINE

## 2022-02-26 PROCEDURE — G8484 FLU IMMUNIZE NO ADMIN: HCPCS | Performed by: INTERNAL MEDICINE

## 2022-02-26 ASSESSMENT — PATIENT HEALTH QUESTIONNAIRE - PHQ9
SUM OF ALL RESPONSES TO PHQ9 QUESTIONS 1 & 2: 0
SUM OF ALL RESPONSES TO PHQ QUESTIONS 1-9: 0
2. FEELING DOWN, DEPRESSED OR HOPELESS: 0
SUM OF ALL RESPONSES TO PHQ QUESTIONS 1-9: 0
1. LITTLE INTEREST OR PLEASURE IN DOING THINGS: 0

## 2022-02-26 NOTE — PROGRESS NOTES
[facility]) Physicians  Internal Medicine  Patient Encounter  Kamille Monae D.O., [place]           Chief Complaint   Patient presents with   [place] Card    Medication Check       HPI: 68 y.o. male with multiple medical problems seen today regarding the status of his current chronic medical problems below along with a medication review and reconciliation. Preventative healthcare items:  Patient refuses flu vaccine, shingles vaccine, Medicare annual wellness visit. Diabetes Mellitus Type II, Follow-up--   Lab Results   Component Value Date    LABA1C 6.5 09/15/2021   he has not made any new changes. He eats the same way. He does not exercise. He does not check sugars. Last Eye Exam--12/14/2021  U. Microalbumin/Cr--5/19/2021  Pt is on ACEI --Lisinopril. +ASA 81 mg daily   Complications--Neuropathy, PVD. No tobacco use  LDL--65  + Statin    HTN--  He denies any headaches, lightheadedness, syncope. He denies any episodes of unilateral weakness, speech or visual disturbances. Hyperlipidemia: Patient remains on statin therapy. He endorses no adverse side effects such as myalgias or muscle cramping. Lab Results   Component Value Date    LDLCALC 65 09/15/2021      CAD-- Previous history----->  H/O CABG 10/2011. Angiogram 4/29/2013. He sees Dr. Tank Luther. He was last seen on 6/20/2021. He denies CP, SOB, orthopnea. He denies palpitations. He denies ARMENTA. Carotid stenosis-- Doppler showed worsening disease on the left. Referred to vascular. He was seen by an NP.  CTA neck performed 2/7/2022--->Right 36% stenosis. Left ICA 65% which is a lot different than the doppler which suggested 70-99%. He will have another scan in August 2022. PVD/venous insufficiency--Patient does have swelling that seems to worsen as the day progresses. Swelling is better by morning. He denies any claudication. No wounds    Anemia/B12 deficiency--Patient is overdue for lab.   He is not on any B12 supplements. He did not take the injections. BPH-- Urination is good. Stream is good with Flomax. Lab Results   Component Value Date    PSA 0.46 01/29/2021    PSA 0.40 05/01/2019    PSA 0.4 04/25/2018         Past Medical History:   Diagnosis Date    CAD in native artery     Carotid stenoses 4/29/2013    20-49% BL    Cataracts, bilateral     Gouty arthritis of left great toe 5/1/2019    Hyperlipidemia     Hypertension     PVD (peripheral vascular disease) ([facility] [address]     Type II or unspecified type diabetes mellitus without mention of complication, not stated as uncontrolled     Vitamin B12 deficiency 1/20/2021       MEDICATIONS:  Medication Sig   metoprolol tartrate (LOPRESSOR) 25 MG tablet TAKE 1 TABLET TWICE A DAY   simvastatin (ZOCOR) 40 MG tablet TAKE 1 TABLET NIGHTLY   tamsulosin (FLOMAX) 0.4 MG capsule TAKE 1 CAPSULE DAILY   metFORMIN (GLUCOPHAGE) 1000 MG tablet TAKE 1 TABLET TWICE DAILY  WITH MEALS   lisinopril (PRINIVIL;ZESTRIL) 20 MG tablet TAKE 1 TABLET DAILY   pioglitazone (ACTOS) 30 MG tablet TAKE 1 TABLET DAILY   vitamin B-12 (CYANOCOBALAMIN) 1000 MCG tablet Take 1,000 mcg by mouth daily   ONE TOUCH LANCETS MISC by Does not apply route. aspirin 81 MG EC tablet Take 81 mg by mouth daily. Review of Systems   As per HPI      OBJECTIVE:  Vitals:    02/26/22 1000   BP: 136/62   Pulse: 64   Resp: 14   SpO2: 99%   Weight: 194 lb (88 kg)   Height: 5' 8\" (1.727 m)     Body mass index is 29.5 kg/m². Wt Readings from Last 3 Encounters:   02/26/22 194 lb (88 kg)   09/15/21 194 lb (88 kg)   05/19/21 198 lb 6.4 oz (90 kg)     BP Readings from Last 3 Encounters:   02/26/22 136/62   09/15/21 (!) 136/50   05/19/21 132/68          GEN: NAD, A&O, obese. HEENT:  NC/AT, CYN, EOMI, TM's NL, anicetric  NECK: Supple  No thyromegaly. No JVD. Trachea midline. LYMPH: No C/SC lymphadenopathy  CV: Rhythm is regular. Rate normal.  No murmurs. No ectopy.   PULM: CTA   EXT: Trace-1+ pitting edema bilateral lower extremities. GI: Abd. Soft, NT, No masses. No hepatomegaly. NEURO:  No focal or lateralizing deficits. No ataxia. Moves all extremities. Monofilament testing slightly diminished in the feet. Vibratory sensation surprisingly intact  SKIN:  No rashes. Feet normal color and temperature, no large calluses, ulcers or wounds   VASC: No carotid bruits. Unable to palpate pedal pulses. Feet are warm today. ASSESSMENT/PLAN:    1. Type 2 diabetes mellitus with diabetic neuropathy, without long-term current use of insulin ([facility]  2. Type 2 diabetes mellitus with vascular disease ([facility]  Condition stability and control are uncertain  Due for lab  Continue current medications  Monitor for increasing edema with the pioglitazone  Foot exam today  - CBC with Auto Differential; Future  - Comprehensive Metabolic Panel; Future  - Lipid Panel; Future  - Hemoglobin A1C; Future    3. PVD (peripheral vascular disease) ([facility]  Condition is stable without signs of claudication  Pedal pulses are difficult to palpate  Patient has declined lower extremity arterial evaluation.  - Lipid Panel; Future    4. Benign essential HTN  Blood pressure is well controlled for this 77-year-old male  Continue current medication  Continue a no added salt diet  - CBC with Auto Differential; Future  - Comprehensive Metabolic Panel; Future  - Lipid Panel; Future    5. CAD in native artery  Condition is stable without signs of angina or anginal equivalents  Continue to monitor for signs of cardiac decompensation. Continue current medication  Continue routine follow-up with his cardiologist  Continue aspirin  - Lipid Panel; Future    6. Hyperlipidemia, unspecified hyperlipidemia type  Condition stability and control are uncertain  Due for lab  Continue statin therapy as part of his overall cardiovascular disease risk reduction strategy    - Comprehensive Metabolic Panel; Future  - Lipid Panel; Future    7.  Asymptomatic bilateral carotid artery stenosis  Patient now under the care of vascular surgery  He has been seeing the nurse practitioner  Recommend he follow-up with the physician  He will need ongoing serial imaging  - Lipid Panel; Future    8. Need for Tdap vaccination    - Tetanus-Diphth-Acell Pertussis ([address]) 5-2.5-18.5 LF-MCG/0.5 injection; Inject 0.5 mLs into the muscle once for 1 dose  Dispense: 0.5 mL; Refill: 0    9. Vitamin B12 deficiency    - Vitamin B12; Future    10. Venous insufficiency of both lower extremities  Condition is stable  Continue to monitor for worsening edema and wounds. Compression stockings would be recommended.   Patient declined

## 2022-02-26 NOTE — PATIENT INSTRUCTIONS
To do list:      #1 obtain Tdap (tetanus booster) vaccine    #2 obtain COVID-19 booster vaccine      Patient Education        Diabetes Foot Health: Care Instructions  Your Care Instructions     When you have diabetes, your feet need extra care and attention. Diabetes can damage the nerve endings and blood vessels in your feet, making you less likely to notice when your feet are injured. Diabetes also limits your body's ability to fight infection and get blood to areas that need it. If you get a minor foot injury, it could become an ulcer or a serious infection. With good foot care, you can prevent most of these problems. Caring for your feet can be quick and easy. Most of the care can be done when you are bathing or getting ready for bed. Follow-up care is a key part of your treatment and safety. Be sure to make and go to all appointments, and call your doctor if you are having problems. It's also a good idea to know your test results and keep a list of the medicines you take. How can you care for yourself at home? · Keep your blood sugar close to normal by watching what and how much you eat, monitoring blood sugar, taking medicines if prescribed, and getting regular exercise. · Do not smoke. Smoking affects blood flow and can make foot problems worse. If you need help quitting, talk to your doctor about stop-smoking programs and medicines. These can increase your chances of quitting for good. · Eat a diet that is low in fats. High fat intake can cause fat to build up in your blood vessels and decrease blood flow. · Inspect your feet daily for blisters, cuts, cracks, or sores. If you cannot see well, use a mirror or have someone help you. · Take care of your feet:  ? Wash your feet every day. Use warm (not hot) water. Check the water temperature with your wrists or other part of your body, not your feet. ? Dry your feet well. Pat them dry. Do not rub the skin on your feet too hard.  Dry well between your toes. If the skin on your feet stays moist, bacteria or a fungus can grow, which can lead to infection. ? Keep your skin soft. Use moisturizing skin cream to keep the skin on your feet soft and prevent calluses and cracks. But do not put the cream between your toes, and stop using any cream that causes a rash. ? Clean underneath your toenails carefully. Do not use a sharp object to clean underneath your toenails. Use the blunt end of a nail file or other rounded tool. ? Trim and file your toenails straight across to prevent ingrown toenails. Use a nail clipper, not scissors. Use an emery board to smooth the edges. · Change socks daily. Socks without seams are best, because seams often rub the feet. You can find socks for people with diabetes from specialty catalogs. · Look inside your shoes every day for things like gravel or torn linings, which could cause blisters or sores. · Buy shoes that fit well:  ? Look for shoes that have plenty of space around the toes. This helps prevent bunions and blisters. ? Try on shoes while wearing the kind of socks you will usually wear with the shoes. ? Avoid plastic shoes. They may rub your feet and cause blisters. Good shoes should be made of materials that are flexible and breathable, such as leather or cloth. ? Break in new shoes slowly by wearing them for no more than an hour a day for several days. Take extra time to check your feet for red areas, blisters, or other problems after you wear new shoes. · Do not go barefoot. Do not wear sandals, and do not wear shoes with very thin soles. Thin soles are easy to puncture. They also do not protect your feet from hot pavement or cold weather. · Have your doctor check your feet during each visit. If you have a foot problem, see your doctor. Do not try to treat an early foot problem at home. Home remedies or treatments that you can buy without a prescription (such as corn removers) can be harmful.   · Always get early treatment for foot problems. A minor irritation can lead to a major problem if not properly cared for early. When should you call for help? Call your doctor now or seek immediate medical care if:    · You have a foot sore, an ulcer or break in the skin that is not healing after 4 days, bleeding corns or calluses, or an ingrown toenail.     · You have blue or black areas, which can mean bruising or blood flow problems.     · You have peeling skin or tiny blisters between your toes or cracking or oozing of the skin.     · You have a fever for more than 24 hours and a foot sore.     · You have new numbness or tingling in your feet that does not go away after you move your feet or change positions.     · You have unexplained or unusual swelling of the foot or ankle. Watch closely for changes in your health, and be sure to contact your doctor if:    · You cannot do proper foot care. Where can you learn more? Go to https://Biophytis.[facility] [place]. org and sign in to your [employer] account. Enter A739 in the [employer] box to learn more about \"Diabetes Foot Health: Care Instructions. \"     If you do not have an account, please click on the \"Sign Up Now\" link. Current as of: July 28, 2021               Content Version: 13.1  © 2006-2021 [employer]. Care instructions adapted under license by [facility] ([facility]). If you have questions about a medical condition or this instruction, always ask your healthcare professional. [place] [address] any warranty or liability for your use of this information. Patient Education        Peripheral Arterial Disease (PAD): Care Instructions  Overview  Peripheral arterial disease (PAD) occurs when the blood vessels (arteries) that supply blood to the legs, belly, pelvis, arms, or neck get narrow or blocked. This reduces blood flow to that area. The legs are affected most often.   PAD is often caused by fatty buildup (plaque) in the foods. Limit sodium, sugar, and alcohol. · If your doctor recommends it, get more exercise. Walking is a good choice. Bit by bit, increase the amount you walk every day. Try for at least 30 minutes on most days of the week. If you have symptoms when you exercise, ask your doctor about a special exercise program that may help relieve your symptoms. · Stay at a healthy weight. Lose weight if you need to. · Take good care of your feet. ? Treat cuts and scrapes on your legs right away. Poor blood flow prevents (or slows) quick healing of even small cuts or scrapes. This is even more important if you have diabetes. ? Avoid shoes that are too tight or that rub your feet. Shoes should be comfortable and fit well. ? Avoid socks or stockings that are tight enough to leave elastic-band marks on your legs. Tight socks can make circulation problems worse. ? Keep your feet clean and moisturized to prevent drying and cracking. Place cotton or lamb's wool between your toes to prevent rubbing and to absorb moisture. ? If you have a sore on your leg or foot, keep it dry and cover it with a nonstick bandage until you see your doctor. When should you call for help? Call 911 anytime you think you may need emergency care. For example, call if:    · You have symptoms of a heart attack. These may include:  ? Chest pain or pressure, or a strange feeling in the chest.  ? Sweating. ? Shortness of breath. ? Nausea or vomiting. ? Pain, pressure, or a strange feeling in the back, neck, jaw, or upper belly or in one or both shoulders or arms. ? Lightheadedness or sudden weakness. ? A fast or irregular heartbeat. After you call 911, the  may tell you to chew 1 adult-strength or 2 to 4 low-dose aspirin. Wait for an ambulance. Do not try to drive yourself.    · You have sudden, severe leg pain, and your leg is cool and pale.     · You have symptoms of a stroke.  These may include:  ? Sudden numbness, tingling, weakness, or loss of movement in your face, arm, or leg, especially on only one side of your body. ? Sudden vision changes. ? Sudden trouble speaking. ? Sudden confusion or trouble understanding simple statements. ? Sudden problems with walking or balance. ? A sudden, severe headache that is different from past headaches. Call your doctor now or seek immediate medical care if:    · You have leg pain that does not go away even if you rest.     · Your leg pain changes or gets worse. For example, if you have more pain with normal activity or the same pain with decreased activity, you should call.     · You have cold or numb feet or toes.     · You have leg or foot sores that are slow to heal.     · The skin on your legs or feet changes color.     · You have an open sore on your leg or foot that is infected. Signs of infection include:  ? Increased pain, swelling, warmth, or redness. ? Red streaks leading from the sore. ? Pus draining from the sore. ? A fever. Watch closely for changes in your health, and be sure to contact your doctor if you have any problems. Where can you learn more? Go to https://SongAfter.[employer]. org and sign in to your [employer] account. Enter [phone] [id number] in the [facility] box to learn more about \"Peripheral Arterial Disease (PAD): Care Instructions. \"     If you do not have an account, please click on the \"Sign Up Now\" link. Current as of: April 29, 2021               Content Version: 13.1  © 2006-2021 [employer]. Care instructions adapted under license by [facility] ([facility]). If you have questions about a medical condition or this instruction, always ask your healthcare professional. [place] [address] any warranty or liability for your use of this information. Patient Education        Carotid Stenosis: Care Instructions  Overview     Carotid stenosis is narrowing of one or both of the carotid arteries.  These arteries take blood from the heart to the brain. There is one on each side of the neck. Carotid artery stenosis is caused by a process called hardening of the arteries, or atherosclerosis. A substance called plaque builds up inside the carotid arteries. Things that can lead to plaque include diabetes, high blood pressure, high cholesterol, and smoking. This plaque may limit blood flow to your brain. If plaque breaks open, it may form a blood clot. Or pieces of the plaque may break off. A piece of plaque or a blood clot could move to the brain, causing a stroke or transient ischemic attack (TIA). The goal of treatment is to lower your risk of having a stroke or TIA. You can lower your risk by having a heart-healthy lifestyle and taking medicine. Sometimes a surgery or procedure is also done. Follow-up care is a key part of your treatment and safety. Be sure to make and go to all appointments, and call your doctor if you are having problems. It's also a good idea to know your test results and keep a list of the medicines you take. How can you care for yourself at home? · Take your medicines exactly as prescribed. Call your doctor if you think you are having a problem with your medicine. You may take medicine to lower your blood pressure, to lower your cholesterol, or to prevent blood clots. · If you take a blood thinner, such as aspirin, be sure to get instructions about how to take your medicine safely. Blood thinners can cause serious bleeding problems. · Do not smoke. People who smoke have a higher chance of stroke than those who quit. If you need help quitting, talk to your doctor about stop-smoking programs and medicines. These can increase your chances of quitting for good. · Eat heart-healthy foods. These foods include vegetables, fruits, nuts, beans, lean meat, fish, and whole grains. You limit things that are not so good for your heart, like sodium, alcohol, and sugar. · Stay at a healthy weight. Lose weight if you need to. · Be active. Ask your doctor what type and level of exercise is safe for you. · Limit alcohol to 2 drinks a day for men and 1 drink a day for women. Too much alcohol can cause health problems. · Manage other health problems such as diabetes, high blood pressure, and high cholesterol. If you think you may have a problem with alcohol or drug use, talk to your doctor. · Avoid colds and flu. Get the flu vaccine every year. When should you call for help? Call 911 anytime you think you may need emergency care. For example, call if:    · You passed out (lost consciousness).     · You have symptoms of a stroke. These may include:  ? Sudden numbness, tingling, weakness, or loss of movement in your face, arm, or leg, especially on only one side of your body. ? Sudden vision changes. ? Sudden trouble speaking. ? Sudden confusion or trouble understanding simple statements. ? Sudden problems with walking or balance. ? A sudden, severe headache that is different from past headaches. Call your doctor now or seek immediate medical care if:    · You are dizzy or lightheaded, or you feel like you may faint. Watch closely for changes in your health, and be sure to contact your doctor if you have any problems. Where can you learn more? Go to https://[employer]ise. org and sign in to your [employer] account. Enter R519 in the [employer] box to learn more about \"Carotid Stenosis: Care Instructions. \"     If you do not have an account, please click on the \"Sign Up Now\" link. Current as of: April 29, 2021               Content Version: 13.1  © 2006-2021 Healthwise, Incorporated. Care instructions adapted under license by [facility] ([facility]). If you have questions about a medical condition or this instruction, always ask your healthcare professional. [place] [address] any warranty or liability for your use of this information.

## 2022-06-09 LAB
ALBUMIN SERPL-MCNC: 3.6 G/DL
ALBUMIN SERPL-MCNC: 3.6 G/DL
ALP BLD-CCNC: 61 U/L
ALP BLD-CCNC: 61 U/L
ALT SERPL-CCNC: 8 U/L
ALT SERPL-CCNC: 8 U/L
ANION GAP SERPL CALCULATED.3IONS-SCNC: 7 MMOL/L
ANION GAP SERPL CALCULATED.3IONS-SCNC: 7 MMOL/L
AST SERPL-CCNC: 16 U/L
AST SERPL-CCNC: 16 U/L
AVERAGE GLUCOSE: NORMAL
AVERAGE GLUCOSE: NORMAL
BASOPHILS ABSOLUTE: ABNORMAL
BASOPHILS RELATIVE PERCENT: ABNORMAL
BILIRUB SERPL-MCNC: 0.5 MG/DL (ref 0.1–1.4)
BILIRUB SERPL-MCNC: 0.5 MG/DL (ref 0.1–1.4)
BUN BLDV-MCNC: 22 MG/DL
BUN BLDV-MCNC: 22 MG/DL
CALCIUM SERPL-MCNC: 10.2 MG/DL
CALCIUM SERPL-MCNC: 10.2 MG/DL
CHLORIDE BLD-SCNC: 108 MMOL/L
CHLORIDE BLD-SCNC: 108 MMOL/L
CHOLESTEROL, TOTAL: 131 MG/DL
CHOLESTEROL, TOTAL: 131 MG/DL
CHOLESTEROL/HDL RATIO: NORMAL
CHOLESTEROL/HDL RATIO: NORMAL
CO2: 25 MMOL/L
CO2: 25 MMOL/L
CREAT SERPL-MCNC: 1.16 MG/DL
CREAT SERPL-MCNC: 1.16 MG/DL
EOSINOPHILS ABSOLUTE: ABNORMAL
EOSINOPHILS RELATIVE PERCENT: ABNORMAL
GFR CALCULATED: 65
GFR CALCULATED: 65
GLUCOSE BLD-MCNC: 121 MG/DL
GLUCOSE BLD-MCNC: 121 MG/DL
HBA1C MFR BLD: 6.9 %
HBA1C MFR BLD: 6.9 %
HCT VFR BLD CALC: 34 % (ref 41–53)
HDLC SERPL-MCNC: 60 MG/DL (ref 35–70)
HDLC SERPL-MCNC: 60 MG/DL (ref 35–70)
HEMOGLOBIN: 11.2 G/DL (ref 13.5–17.5)
LDL CHOLESTEROL CALCULATED: 60 MG/DL (ref 0–160)
LDL CHOLESTEROL CALCULATED: 60 MG/DL (ref 0–160)
LYMPHOCYTES ABSOLUTE: ABNORMAL
LYMPHOCYTES RELATIVE PERCENT: ABNORMAL
MCH RBC QN AUTO: 29.6 PG
MCHC RBC AUTO-ENTMCNC: 32.8 G/DL
MCV RBC AUTO: 90.2 FL
MONOCYTES ABSOLUTE: ABNORMAL
MONOCYTES RELATIVE PERCENT: ABNORMAL
NEUTROPHILS ABSOLUTE: ABNORMAL
NEUTROPHILS RELATIVE PERCENT: ABNORMAL
NONHDLC SERPL-MCNC: NORMAL MG/DL
NONHDLC SERPL-MCNC: NORMAL MG/DL
PLATELET # BLD: 174 K/ΜL
PMV BLD AUTO: 8 FL
POTASSIUM SERPL-SCNC: 5 MMOL/L
POTASSIUM SERPL-SCNC: 5 MMOL/L
RBC # BLD: 3.77 10^6/ΜL
SODIUM BLD-SCNC: 140 MMOL/L
SODIUM BLD-SCNC: 140 MMOL/L
TOTAL PROTEIN: 6
TOTAL PROTEIN: 6
TRIGL SERPL-MCNC: 57 MG/DL
TRIGL SERPL-MCNC: 57 MG/DL
VITAMIN B-12: 514
VITAMIN B-12: 514
VLDLC SERPL CALC-MCNC: NORMAL MG/DL
VLDLC SERPL CALC-MCNC: NORMAL MG/DL
WBC # BLD: 3.7 10^3/ML

## 2022-06-14 LAB
ALBUMIN SERPL-MCNC: 4.1 G/DL
ALP BLD-CCNC: 58 U/L
ALT SERPL-CCNC: 11 U/L
ANION GAP SERPL CALCULATED.3IONS-SCNC: 8 MMOL/L
AST SERPL-CCNC: 13 U/L
AVERAGE GLUCOSE: NORMAL
BASOPHILS ABSOLUTE: NORMAL
BASOPHILS RELATIVE PERCENT: NORMAL
BILIRUB SERPL-MCNC: 0.7 MG/DL (ref 0.1–1.4)
BUN BLDV-MCNC: 10 MG/DL
CALCIUM SERPL-MCNC: 9.6 MG/DL
CHLORIDE BLD-SCNC: 99 MMOL/L
CHOLESTEROL, TOTAL: 267 MG/DL
CHOLESTEROL/HDL RATIO: ABNORMAL
CO2: 30 MMOL/L
CREAT SERPL-MCNC: 0.56 MG/DL
EOSINOPHILS ABSOLUTE: NORMAL
EOSINOPHILS RELATIVE PERCENT: NORMAL
GFR CALCULATED: NORMAL
GLUCOSE BLD-MCNC: 82 MG/DL
HBA1C MFR BLD: 6 %
HCT VFR BLD CALC: 44.9 % (ref 41–53)
HDLC SERPL-MCNC: 105 MG/DL (ref 35–70)
HEMOGLOBIN: 15.5 G/DL (ref 13.5–17.5)
LDL CHOLESTEROL CALCULATED: 144 MG/DL (ref 0–160)
LYMPHOCYTES ABSOLUTE: NORMAL
LYMPHOCYTES RELATIVE PERCENT: NORMAL
MCH RBC QN AUTO: 31.8 PG
MCHC RBC AUTO-ENTMCNC: 34.5 G/DL
MCV RBC AUTO: 92.3 FL
MONOCYTES ABSOLUTE: NORMAL
MONOCYTES RELATIVE PERCENT: NORMAL
NEUTROPHILS ABSOLUTE: NORMAL
NEUTROPHILS RELATIVE PERCENT: NORMAL
NONHDLC SERPL-MCNC: ABNORMAL MG/DL
PLATELET # BLD: 439 K/ΜL
PMV BLD AUTO: 7.4 FL
POTASSIUM SERPL-SCNC: 4.1 MMOL/L
RBC # BLD: 4.86 10^6/ΜL
SEDIMENTATION RATE, ERYTHROCYTE: 2
SODIUM BLD-SCNC: 137 MMOL/L
TOTAL PROTEIN: 6.4
TRIGL SERPL-MCNC: 89 MG/DL
TSH SERPL DL<=0.05 MIU/L-ACNC: 0.59 UIU/ML
VLDLC SERPL CALC-MCNC: ABNORMAL MG/DL
WBC # BLD: 11 10^3/ML

## 2022-10-26 ENCOUNTER — OFFICE VISIT (OUTPATIENT)
Dept: INTERNAL MEDICINE CLINIC | Age: 77
End: 2022-10-26
Payer: MEDICARE

## 2022-10-26 ENCOUNTER — TELEPHONE (OUTPATIENT)
Dept: INTERNAL MEDICINE CLINIC | Age: 77
End: 2022-10-26

## 2022-10-26 VITALS
HEART RATE: 68 BPM | RESPIRATION RATE: 14 BRPM | WEIGHT: 190.6 LBS | SYSTOLIC BLOOD PRESSURE: 126 MMHG | BODY MASS INDEX: 28.98 KG/M2 | DIASTOLIC BLOOD PRESSURE: 62 MMHG | OXYGEN SATURATION: 98 %

## 2022-10-26 DIAGNOSIS — I25.10 CAD IN NATIVE ARTERY: ICD-10-CM

## 2022-10-26 DIAGNOSIS — R60.0 BILATERAL LEG EDEMA: ICD-10-CM

## 2022-10-26 DIAGNOSIS — E53.8 B12 DEFICIENCY: ICD-10-CM

## 2022-10-26 DIAGNOSIS — E11.40 TYPE 2 DIABETES MELLITUS WITH DIABETIC NEUROPATHY, WITHOUT LONG-TERM CURRENT USE OF INSULIN (HCC): Primary | ICD-10-CM

## 2022-10-26 DIAGNOSIS — I10 BENIGN ESSENTIAL HTN: ICD-10-CM

## 2022-10-26 DIAGNOSIS — I73.9 PVD (PERIPHERAL VASCULAR DISEASE) (HCC): ICD-10-CM

## 2022-10-26 DIAGNOSIS — D64.9 NORMOCYTIC ANEMIA: ICD-10-CM

## 2022-10-26 DIAGNOSIS — I87.2 VENOUS INSUFFICIENCY OF BOTH LOWER EXTREMITIES: ICD-10-CM

## 2022-10-26 DIAGNOSIS — I65.23 ASYMPTOMATIC BILATERAL CAROTID ARTERY STENOSIS: ICD-10-CM

## 2022-10-26 DIAGNOSIS — E78.5 HYPERLIPIDEMIA, UNSPECIFIED HYPERLIPIDEMIA TYPE: ICD-10-CM

## 2022-10-26 DIAGNOSIS — E11.59 TYPE 2 DIABETES MELLITUS WITH VASCULAR DISEASE (HCC): ICD-10-CM

## 2022-10-26 PROCEDURE — 3078F DIAST BP <80 MM HG: CPT | Performed by: INTERNAL MEDICINE

## 2022-10-26 PROCEDURE — 99214 OFFICE O/P EST MOD 30 MIN: CPT | Performed by: INTERNAL MEDICINE

## 2022-10-26 PROCEDURE — 1036F TOBACCO NON-USER: CPT | Performed by: INTERNAL MEDICINE

## 2022-10-26 PROCEDURE — G8427 DOCREV CUR MEDS BY ELIG CLIN: HCPCS | Performed by: INTERNAL MEDICINE

## 2022-10-26 PROCEDURE — G8484 FLU IMMUNIZE NO ADMIN: HCPCS | Performed by: INTERNAL MEDICINE

## 2022-10-26 PROCEDURE — 3074F SYST BP LT 130 MM HG: CPT | Performed by: INTERNAL MEDICINE

## 2022-10-26 PROCEDURE — G8417 CALC BMI ABV UP PARAM F/U: HCPCS | Performed by: INTERNAL MEDICINE

## 2022-10-26 PROCEDURE — 1123F ACP DISCUSS/DSCN MKR DOCD: CPT | Performed by: INTERNAL MEDICINE

## 2022-10-26 NOTE — PROGRESS NOTES
[facility]) Physicians  Internal Medicine  Patient Encounter  Patti Parada D.O., [facility]           Chief Complaint   Patient presents with    Check-Up    Diabetes       HPI: 68 y.o. male with multiple medical problems seen today regarding the status of his current chronic medical problems below along with a medication review and reconciliation. Preventative healthcare items:  Patient refuses flu vaccine, shingles vaccine, Medicare annual wellness visit. We reviewed lab from 6/9/2022 done at [facility]. We did not know he had lab testing done. Hgb 11.2  WBC 3.7    Diabetes Mellitus Type II, Follow-up--   Lab Results   Component Value Date    LABA1C 6.5 09/15/2021   he has not made any new changes. He does not check sugars. His A1C 6/9/2022 6.9%. Last Eye Exam--12/14/2021  U. Microalbumin/Cr--5/19/2021  Last Foot exam--2/26/2022  Pt is on ACEI --Lisinopril. +ASA 81 mg daily   Complications--Neuropathy, PVD. No tobacco use  LDL--65  + Statin    HTN--  He denies any headaches, lightheadedness, syncope. He denies any episodes of unilateral weakness, speech or visual disturbances. Hyperlipidemia: Patient remains on statin therapy. He endorses no adverse side effects such as myalgias or muscle cramping. Lab Results   Component Value Date    LDLCALC 65 09/15/2021      CAD-- Previous history----->  H/O CABG 10/2011. Angiogram 4/29/2013. He sees Dr. Bruno Espinoza. He was last seen on 6/27/2022. No new changes or testing. Carotid stenosis-- Doppler showed worsening disease on the left. Referred to vascular. He was seen by an NP.  CTA neck performed 2/7/2022--->Right 36% stenosis. Left ICA 65% which is a lot different than the doppler which suggested 70-99%. He will have another scan in August 2022. He will have another carotid doppler next year. PVD/venous insufficiency--Patient does have swelling that seems to worsen as the day progresses. Swelling is better by morning.   He denies any claudication. No wounds    Anemia/B12 deficiency--Patient is overdue for lab. He is not on any B12 supplements. He did not take the injections. Past Medical History:   Diagnosis Date    CAD in native artery     Carotid stenoses 4/29/2013    20-49% BL    Cataracts, bilateral     Gouty arthritis of left great toe 5/1/2019    Hyperlipidemia     Hypertension     PVD (peripheral vascular disease) ([facility]     Type II or unspecified type diabetes mellitus without mention of complication, not stated as uncontrolled     Vitamin B12 deficiency 1/20/2021       Prior to Visit Medications    Medication Sig   metoprolol tartrate (LOPRESSOR) 25 MG tablet TAKE 1 TABLET TWICE A DAY   simvastatin (ZOCOR) 40 MG tablet TAKE 1 TABLET NIGHTLY   tamsulosin (FLOMAX) 0.4 MG capsule TAKE 1 CAPSULE DAILY   metFORMIN (GLUCOPHAGE) 1000 MG tablet TAKE 1 TABLET TWICE DAILY  WITH MEALS   lisinopril (PRINIVIL;ZESTRIL) 20 MG tablet TAKE 1 TABLET DAILY   pioglitazone (ACTOS) 30 MG tablet TAKE 1 TABLET DAILY   vitamin B-12 (CYANOCOBALAMIN) 1000 MCG tablet Take 1,000 mcg by mouth daily   ONE TOUCH LANCETS MISC by Does not apply route. aspirin 81 MG EC tablet Take 81 mg by mouth daily. Review of Systems   As per HPI      OBJECTIVE:  Vitals:    10/26/22 1329   BP: 126/62   Pulse: 68   Resp: 14   SpO2: 98%   Weight: 190 lb 9.6 oz (86.5 kg)     Body mass index is 28.98 kg/m². Wt Readings from Last 3 Encounters:   10/26/22 190 lb 9.6 oz (86.5 kg)   02/26/22 194 lb (88 kg)   09/15/21 194 lb (88 kg)     BP Readings from Last 3 Encounters:   10/26/22 126/62   02/26/22 136/62   09/15/21 (!) 136/50          GEN: NAD, A&O, obese. HEENT:  NC/AT, CYN, EOMI, TM's NL, anicetric  NECK: Supple  No thyromegaly. No JVD. Trachea midline. LYMPH: No C/SC lymphadenopathy  CV: Rhythm is regular. Rate normal.  No murmurs. No ectopy. PULM: CTA   EXT: Trace-1+ pitting edema bilateral lower extremities. GI: Abd. Soft, NT, No masses.   No hepatomegaly. NEURO:  No focal or lateralizing deficits. No ataxia. Moves all extremities. SKIN:  No rashes. VASC: No carotid bruits. Faint pedal pulses, feet are warm      ASSESSMENT/PLAN:    1. Type 2 diabetes mellitus with diabetic neuropathy, without long-term current use of insulin ([facility]  2. Type 2 diabetes mellitus with vascular disease ([facility]  Diabetes control is uncertain  Overdue for A1c  Urine test today  Continue efforts with carb controlled diet. Patient counseled  Also encouraged patient to become more physically active  - Microalbumin / Creatinine Urine Ratio  - CBC with Auto Differential; Future  - Comprehensive Metabolic Panel; Future  - Lipid Panel; Future  - Hemoglobin A1C; Future    3. PVD (peripheral vascular disease) (HCC)  Condition is asymptomatic  No claudication  Pulses are diminished on examination  Continue to monitor for exertional claudication    4. Normocytic anemia  Hemoglobin was a little worse on lab work from June  Check iron levels  FIT  May need anoscopy  - CBC with Auto Differential; Future  - Iron and TIBC; Future  - Ferritin; Future    5. Benign essential HTN  Blood pressure is well controlled  Continue lisinopril and metoprolol   - CBC with Auto Differential; Future  - Comprehensive Metabolic Panel; Future  - Lipid Panel; Future    6. CAD in native artery  asymptomatic  Continue to monitor for signs of angina and anginal equivalent  Follow-up with cardiology every year  - Comprehensive Metabolic Panel; Future  - Lipid Panel; Future    7. Hyperlipidemia, unspecified hyperlipidemia type  Condition stability is uncertain  Repeat lab  Continue simvastatin as part of his overall cardiovascular disease secondary prevention strategy  - Comprehensive Metabolic Panel; Future  - Lipid Panel; Future    8. Venous insufficiency of both lower extremities  Asymptomatic  Edema could be worsened by pioglitazone  Continue to monitor    9.  Asymptomatic bilateral carotid artery stenosis  Due for carotid Doppler next year  Reviewed CTA of the neck showing 65% stenosis on the left    10. Bilateral leg edema  As above    11.  B12 deficiency  Well-controlled

## 2022-10-26 NOTE — TELEPHONE ENCOUNTER
Patient was in office today and asked that I send records to [id number] @ [facility]. I asked what records they needed me to send and they were not exactly sure. I called and left Demetris Aggie a message on her  at [phone]. I do not want to send any records with out knowing exactly what they need. When Demetris Marcial calls back, please obtain all information. What is needed to be sent? I will then fax it to her attn: @ [phone].

## 2022-10-26 NOTE — PATIENT INSTRUCTIONS
To do list:      #1 obtain your lab tests    #2 perform a home Fecal Immunochemical Test (stool test for microscopic blood)

## 2022-10-27 LAB
CREATININE URINE: 84 MG/DL (ref 39–259)
MICROALBUMIN UR-MCNC: <1.2 MG/DL
MICROALBUMIN/CREAT UR-RTO: NORMAL MG/G (ref 0–30)

## 2022-11-16 LAB
ALBUMIN SERPL-MCNC: 3.5 G/DL
ALP BLD-CCNC: 71 U/L
ALT SERPL-CCNC: 8 U/L
ANION GAP SERPL CALCULATED.3IONS-SCNC: 6 MMOL/L
AST SERPL-CCNC: 13 U/L
AVERAGE GLUCOSE: NORMAL
BASOPHILS ABSOLUTE: 35 /ΜL
BASOPHILS RELATIVE PERCENT: 0.7 %
BILIRUB SERPL-MCNC: 0.5 MG/DL (ref 0.1–1.4)
BUN BLDV-MCNC: 19 MG/DL
CALCIUM SERPL-MCNC: 10.1 MG/DL
CHLORIDE BLD-SCNC: 105 MMOL/L
CHOLESTEROL, TOTAL: 136 MG/DL
CHOLESTEROL/HDL RATIO: NORMAL
CO2: 28 MMOL/L
CREAT SERPL-MCNC: 1.1 MG/DL
EGFR: 69
EOSINOPHILS ABSOLUTE: 90 /ΜL
EOSINOPHILS RELATIVE PERCENT: 1.8 %
FERRITIN: 42.4 NG/ML (ref 18–300)
GLUCOSE BLD-MCNC: 125 MG/DL
HBA1C MFR BLD: 7 %
HCT VFR BLD CALC: 33.2 % (ref 41–53)
HDLC SERPL-MCNC: 48 MG/DL (ref 35–70)
HEMOGLOBIN: 10.8 G/DL (ref 13.5–17.5)
IRON: 96
LDL CHOLESTEROL CALCULATED: 62 MG/DL (ref 0–160)
LYMPHOCYTES ABSOLUTE: 1720 /ΜL
LYMPHOCYTES RELATIVE PERCENT: 34.4 %
MCH RBC QN AUTO: 29.6 PG
MCHC RBC AUTO-ENTMCNC: 32.6 G/DL
MCV RBC AUTO: 90.7 FL
MONOCYTES ABSOLUTE: 440 /ΜL
MONOCYTES RELATIVE PERCENT: 8.8 %
NEUTROPHILS ABSOLUTE: 2715 /ΜL
NEUTROPHILS RELATIVE PERCENT: 54.3 %
NONHDLC SERPL-MCNC: NORMAL MG/DL
PLATELET # BLD: 309 K/ΜL
PMV BLD AUTO: 7.5 FL
POTASSIUM SERPL-SCNC: 5 MMOL/L
RBC # BLD: 3.66 10^6/ΜL
SODIUM BLD-SCNC: 139 MMOL/L
TOTAL IRON BINDING CAPACITY: 450
TOTAL PROTEIN: 6.2
TRIGL SERPL-MCNC: 129 MG/DL
VLDLC SERPL CALC-MCNC: NORMAL MG/DL
WBC # BLD: 5 10^3/ML

## 2022-11-21 ENCOUNTER — NURSE ONLY (OUTPATIENT)
Dept: INTERNAL MEDICINE CLINIC | Age: 77
End: 2022-11-21
Payer: MEDICARE

## 2022-11-21 DIAGNOSIS — Z12.11 SCREENING FOR COLON CANCER: Primary | ICD-10-CM

## 2022-11-21 LAB
CONTROL: NORMAL
HEMOCCULT STL QL: NEGATIVE

## 2022-11-21 PROCEDURE — 82274 ASSAY TEST FOR BLOOD FECAL: CPT | Performed by: INTERNAL MEDICINE

## 2022-11-23 ENCOUNTER — TELEPHONE (OUTPATIENT)
Dept: INTERNAL MEDICINE CLINIC | Age: 77
End: 2022-11-23

## 2022-11-28 DIAGNOSIS — Z12.11 SCREEN FOR COLON CANCER: ICD-10-CM

## 2022-11-28 DIAGNOSIS — D64.9 NORMOCYTIC ANEMIA: Primary | ICD-10-CM

## 2022-11-28 NOTE — TELEPHONE ENCOUNTER
Please fax to their office and let pt know we faxed referral to the GI office at [address].

## 2022-12-30 DIAGNOSIS — E78.5 HYPERLIPIDEMIA, UNSPECIFIED HYPERLIPIDEMIA TYPE: ICD-10-CM

## 2022-12-30 DIAGNOSIS — I25.10 CAD IN NATIVE ARTERY: ICD-10-CM

## 2022-12-30 DIAGNOSIS — I10 BENIGN ESSENTIAL HTN: ICD-10-CM

## 2022-12-30 DIAGNOSIS — E11.59 TYPE 2 DIABETES MELLITUS WITH VASCULAR DISEASE (HCC): ICD-10-CM

## 2022-12-30 RX ORDER — TAMSULOSIN HYDROCHLORIDE 0.4 MG/1
CAPSULE ORAL
Qty: 90 CAPSULE | Refills: 3 | Status: SHIPPED | OUTPATIENT
Start: 2022-12-30

## 2022-12-30 RX ORDER — SIMVASTATIN 40 MG
TABLET ORAL
Qty: 90 TABLET | Refills: 3 | Status: SHIPPED | OUTPATIENT
Start: 2022-12-30

## 2022-12-30 RX ORDER — PIOGLITAZONEHYDROCHLORIDE 30 MG/1
TABLET ORAL
Qty: 90 TABLET | Refills: 3 | Status: SHIPPED | OUTPATIENT
Start: 2022-12-30

## 2022-12-30 RX ORDER — LISINOPRIL 20 MG/1
TABLET ORAL
Qty: 90 TABLET | Refills: 3 | Status: SHIPPED | OUTPATIENT
Start: 2022-12-30

## 2023-02-28 ENCOUNTER — OFFICE VISIT (OUTPATIENT)
Dept: INTERNAL MEDICINE CLINIC | Age: 78
End: 2023-02-28
Payer: MEDICARE

## 2023-02-28 VITALS
DIASTOLIC BLOOD PRESSURE: 56 MMHG | SYSTOLIC BLOOD PRESSURE: 124 MMHG | HEART RATE: 64 BPM | BODY MASS INDEX: 29.5 KG/M2 | WEIGHT: 194 LBS

## 2023-02-28 DIAGNOSIS — I73.9 PVD (PERIPHERAL VASCULAR DISEASE) (HCC): ICD-10-CM

## 2023-02-28 DIAGNOSIS — E53.8 B12 DEFICIENCY: ICD-10-CM

## 2023-02-28 DIAGNOSIS — D64.9 NORMOCYTIC ANEMIA: ICD-10-CM

## 2023-02-28 DIAGNOSIS — E11.40 TYPE 2 DIABETES MELLITUS WITH DIABETIC NEUROPATHY, WITHOUT LONG-TERM CURRENT USE OF INSULIN (HCC): ICD-10-CM

## 2023-02-28 DIAGNOSIS — E11.59 TYPE 2 DIABETES MELLITUS WITH VASCULAR DISEASE (HCC): ICD-10-CM

## 2023-02-28 DIAGNOSIS — I25.10 CAD IN NATIVE ARTERY: ICD-10-CM

## 2023-02-28 DIAGNOSIS — R06.83 SNORING: ICD-10-CM

## 2023-02-28 DIAGNOSIS — E78.5 HYPERLIPIDEMIA, UNSPECIFIED HYPERLIPIDEMIA TYPE: ICD-10-CM

## 2023-02-28 DIAGNOSIS — R53.83 FATIGUE, UNSPECIFIED TYPE: ICD-10-CM

## 2023-02-28 DIAGNOSIS — I10 BENIGN ESSENTIAL HTN: ICD-10-CM

## 2023-02-28 DIAGNOSIS — D64.9 NORMOCYTIC ANEMIA: Primary | ICD-10-CM

## 2023-02-28 DIAGNOSIS — I87.2 VENOUS INSUFFICIENCY OF BOTH LOWER EXTREMITIES: ICD-10-CM

## 2023-02-28 LAB
A/G RATIO: 1.5 (ref 1.1–2.2)
ALBUMIN SERPL-MCNC: 4.2 G/DL (ref 3.4–5)
ALP BLD-CCNC: 92 U/L (ref 40–129)
ALT SERPL-CCNC: 11 U/L (ref 10–40)
ANION GAP SERPL CALCULATED.3IONS-SCNC: 11 MMOL/L (ref 3–16)
AST SERPL-CCNC: 18 U/L (ref 15–37)
BASOPHILS ABSOLUTE: 0 K/UL (ref 0–0.2)
BASOPHILS RELATIVE PERCENT: 0.4 %
BILIRUB SERPL-MCNC: 0.4 MG/DL (ref 0–1)
BUN BLDV-MCNC: 23 MG/DL (ref 7–20)
CALCIUM SERPL-MCNC: 10.4 MG/DL (ref 8.3–10.6)
CHLORIDE BLD-SCNC: 104 MMOL/L (ref 99–110)
CHOLESTEROL, TOTAL: 141 MG/DL (ref 0–199)
CO2: 24 MMOL/L (ref 21–32)
CREAT SERPL-MCNC: 1.3 MG/DL (ref 0.8–1.3)
EOSINOPHILS ABSOLUTE: 0.2 K/UL (ref 0–0.6)
EOSINOPHILS RELATIVE PERCENT: 3.4 %
ESTIMATED AVERAGE GLUCOSE: 145.6 MG/DL
FOLATE: 11.56 NG/ML (ref 4.78–24.2)
GFR SERPL CREATININE-BSD FRML MDRD: 56 ML/MIN/{1.73_M2}
GLUCOSE BLD-MCNC: 153 MG/DL (ref 70–99)
HBA1C MFR BLD: 6.7 %
HCT VFR BLD CALC: 34.8 % (ref 40.5–52.5)
HDLC SERPL-MCNC: 66 MG/DL (ref 40–60)
HEMOGLOBIN: 11 G/DL (ref 13.5–17.5)
LDL CHOLESTEROL CALCULATED: 60 MG/DL
LYMPHOCYTES ABSOLUTE: 1.5 K/UL (ref 1–5.1)
LYMPHOCYTES RELATIVE PERCENT: 27.7 %
MCH RBC QN AUTO: 28.9 PG (ref 26–34)
MCHC RBC AUTO-ENTMCNC: 31.7 G/DL (ref 31–36)
MCV RBC AUTO: 91.3 FL (ref 80–100)
MONOCYTES ABSOLUTE: 0.6 K/UL (ref 0–1.3)
MONOCYTES RELATIVE PERCENT: 10.3 %
NEUTROPHILS ABSOLUTE: 3.2 K/UL (ref 1.7–7.7)
NEUTROPHILS RELATIVE PERCENT: 58.2 %
PDW BLD-RTO: 14.1 % (ref 12.4–15.4)
PLATELET # BLD: 187 K/UL (ref 135–450)
PMV BLD AUTO: 9.2 FL (ref 5–10.5)
POTASSIUM SERPL-SCNC: 4.7 MMOL/L (ref 3.5–5.1)
RBC # BLD: 3.81 M/UL (ref 4.2–5.9)
SODIUM BLD-SCNC: 139 MMOL/L (ref 136–145)
TOTAL PROTEIN: 7 G/DL (ref 6.4–8.2)
TRIGL SERPL-MCNC: 77 MG/DL (ref 0–150)
TSH SERPL DL<=0.05 MIU/L-ACNC: 4.16 UIU/ML (ref 0.27–4.2)
VITAMIN B-12: 874 PG/ML (ref 211–911)
VLDLC SERPL CALC-MCNC: 15 MG/DL
WBC # BLD: 5.5 K/UL (ref 4–11)

## 2023-02-28 PROCEDURE — G8427 DOCREV CUR MEDS BY ELIG CLIN: HCPCS | Performed by: INTERNAL MEDICINE

## 2023-02-28 PROCEDURE — 3288F FALL RISK ASSESSMENT DOCD: CPT | Performed by: INTERNAL MEDICINE

## 2023-02-28 PROCEDURE — G8417 CALC BMI ABV UP PARAM F/U: HCPCS | Performed by: INTERNAL MEDICINE

## 2023-02-28 PROCEDURE — 1123F ACP DISCUSS/DSCN MKR DOCD: CPT | Performed by: INTERNAL MEDICINE

## 2023-02-28 PROCEDURE — 99214 OFFICE O/P EST MOD 30 MIN: CPT | Performed by: INTERNAL MEDICINE

## 2023-02-28 PROCEDURE — 3078F DIAST BP <80 MM HG: CPT | Performed by: INTERNAL MEDICINE

## 2023-02-28 PROCEDURE — 3074F SYST BP LT 130 MM HG: CPT | Performed by: INTERNAL MEDICINE

## 2023-02-28 PROCEDURE — G8484 FLU IMMUNIZE NO ADMIN: HCPCS | Performed by: INTERNAL MEDICINE

## 2023-02-28 PROCEDURE — 1036F TOBACCO NON-USER: CPT | Performed by: INTERNAL MEDICINE

## 2023-02-28 SDOH — ECONOMIC STABILITY: FOOD INSECURITY: WITHIN THE PAST 12 MONTHS, YOU WORRIED THAT YOUR FOOD WOULD RUN OUT BEFORE YOU GOT MONEY TO BUY MORE.: NEVER TRUE

## 2023-02-28 SDOH — ECONOMIC STABILITY: HOUSING INSECURITY
IN THE LAST 12 MONTHS, WAS THERE A TIME WHEN YOU DID NOT HAVE A STEADY PLACE TO SLEEP OR SLEPT IN A SHELTER (INCLUDING NOW)?: NO

## 2023-02-28 SDOH — ECONOMIC STABILITY: FOOD INSECURITY: WITHIN THE PAST 12 MONTHS, THE FOOD YOU BOUGHT JUST DIDN'T LAST AND YOU DIDN'T HAVE MONEY TO GET MORE.: NEVER TRUE

## 2023-02-28 SDOH — ECONOMIC STABILITY: INCOME INSECURITY: HOW HARD IS IT FOR YOU TO PAY FOR THE VERY BASICS LIKE FOOD, HOUSING, MEDICAL CARE, AND HEATING?: NOT HARD AT ALL

## 2023-02-28 ASSESSMENT — PATIENT HEALTH QUESTIONNAIRE - PHQ9
1. LITTLE INTEREST OR PLEASURE IN DOING THINGS: 0
SUM OF ALL RESPONSES TO PHQ QUESTIONS 1-9: 0
2. FEELING DOWN, DEPRESSED OR HOPELESS: 0
SUM OF ALL RESPONSES TO PHQ QUESTIONS 1-9: 0
SUM OF ALL RESPONSES TO PHQ QUESTIONS 1-9: 0
SUM OF ALL RESPONSES TO PHQ9 QUESTIONS 1 & 2: 0
SUM OF ALL RESPONSES TO PHQ QUESTIONS 1-9: 0

## 2023-02-28 NOTE — PROGRESS NOTES
[facility]) Physicians  Internal Medicine  Patient Encounter  Riky Gonzalez D.O., [facility]           Chief Complaint   Patient presents with    Check-Up     4 month check up       HPI: 68 y.o. male with multiple medical problems seen today regarding the status of his current chronic medical problems below along with a medication review and reconciliation. Wife reports that he sleeps all the time. He snores. They do not sleep in the same room. He states he refuses sleep evaluation. Preventative healthcare items:  Patient refuses flu vaccine, shingles vaccine, Medicare annual wellness visit. Diabetes Mellitus Type II, Follow-up--   Lab Results   Component Value Date    LABA1C 6.0 06/14/2022   he has not made any new changes. He does not check sugars. His A1C 6/9/2022 6.9%. Last Eye Exam--12/20/2022  U. Microalbumin/Cr--10/26/2022  Last Foot exam--2/26/2022, Overdue  Pt is on ACEI --Lisinopril. +ASA 81 mg daily   Complications--Neuropathy, PVD. No tobacco use  LDL--62  + Statin    HTN--  He denies any headaches, lightheadedness, syncope. He denies any episodes of unilateral weakness, speech or visual disturbances. He continues to have swelling. Hyperlipidemia: Patient remains on statin therapy. He endorses no adverse side effects such as myalgias or muscle cramping. 11/16/2022 lipid profile showed an LDL of 62. Lab Results   Component Value Date    LDLCALC 144 06/14/2022      CAD-- Previous history----->  H/O CABG 10/2011. Angiogram 4/29/2013. He sees Dr. Laura Watt. He was last seen on 6/27/2022. No new changes or testing. He is physically active at home--yard work. He golfs. No new CP, SOB, palpitation, orthopnea. He does swell. Carotid stenosis-- Doppler showed worsening disease on the left. Referred to vascular. He was seen by an NP.  CTA neck performed 2/7/2022--->Right 36% stenosis.    Left ICA 65% which is a lot different than the doppler which suggested 70-99%. He will have another scan in August 2022. He saw Dr. Jessi Chilel NP. He had a CTA neck 2/7/2022---> IMPRESSION:   1. Right internal carotid artery stenosis with measured diameter reduction of 36%. .   2. Left internal carotid artery stenosis with measured diameter reduction of 65%   3. Normal vertebral arteries. 4. Skull base intracranial circulation is normal   Due for follow up. PVD/venous insufficiency--Patient does have swelling that seems to worsen as the day progresses. Swelling is better by morning. No new changes. No claudication. Anemia/B12 deficiency--Patient is overdue for lab. He is not on any B12 supplements. He did not take the injections. Past Medical History:   Diagnosis Date    CAD in native artery     Carotid stenoses 4/29/2013    20-49% BL    Cataracts, bilateral     Gouty arthritis of left great toe 5/1/2019    Hyperlipidemia     Hypertension     PVD (peripheral vascular disease) ([facility] [address]     Type II or unspecified type diabetes mellitus without mention of complication, not stated as uncontrolled     Vitamin B12 deficiency 1/20/2021       MEDICATIONS:  Prior to Visit Medications    Medication Sig   metFORMIN (GLUCOPHAGE) 1000 MG tablet TAKE 1 TABLET TWICE DAILY  WITH MEALS   simvastatin (ZOCOR) 40 MG tablet TAKE 1 TABLET NIGHTLY   metoprolol tartrate (LOPRESSOR) 25 MG tablet TAKE 1 TABLET TWICE A DAY   tamsulosin (FLOMAX) 0.4 MG capsule TAKE 1 CAPSULE DAILY   lisinopril (PRINIVIL;ZESTRIL) 20 MG tablet TAKE 1 TABLET DAILY   pioglitazone (ACTOS) 30 MG tablet TAKE 1 TABLET DAILY   vitamin B-12 (CYANOCOBALAMIN) 1000 MCG tablet Take 1,000 mcg by mouth daily   aspirin 81 MG EC tablet Take 81 mg by mouth daily. ONE TOUCH LANCETS MISC by Does not apply route.   Patient not taking: Reported on 2/28/2023             Review of Systems   As per HPI      OBJECTIVE:  Vitals:    02/28/23 0806   BP: (!) 124/56   Pulse: 64   Weight: 194 lb (88 kg)     Body mass index is 29.5 kg/m². Wt Readings from Last 3 Encounters:   02/28/23 194 lb (88 kg)   10/26/22 190 lb 9.6 oz (86.5 kg)   02/26/22 194 lb (88 kg)     BP Readings from Last 3 Encounters:   02/28/23 (!) 124/56   10/26/22 126/62   02/26/22 136/62          GEN: NAD, A&O, obese. HEENT:  NC/AT, CYN, EOMI, TM's NL, anicetric  NECK: Supple  No thyromegaly. No JVD. Trachea midline. LYMPH: No C/SC lymphadenopathy  CV: Rhythm is regular. Rate normal.  No murmurs. No ectopy. PULM: CTA   EXT: 1+ BL LE pitting edema bilateral lower extremities. GI: Abd. Soft, NT, No masses. No hepatomegaly. NEURO:  No focal or lateralizing deficits. No ataxia. Moves all extremities. SKIN:  No rashes. VASC:  Faint pedal pulses, feet are warm. Soft left carotid bruit. ASSESSMENT/PLAN:    1. Type 2 diabetes mellitus with vascular disease ([facility] [address]  2. Type 2 diabetes mellitus with diabetic neuropathy, without long-term current use of insulin (HCC)  Diabetes control is uncertain. Patient has struggled over the years to keep his A1c below 7%  His last couple have been quite good. Continue efforts with lifestyle modification. Unfortunately he leads a sedentary lifestyle. Foot exam today  Patient has tolerated metformin well. He will continue 1000 mg twice daily  Patient will also continue pioglitazone 30 mg daily. This may be contributing to his edema.  -  DIABETES FOOT EXAM  - CBC with Auto Differential; Future  - Comprehensive Metabolic Panel; Future  - Lipid Panel; Future  - Hemoglobin A1C; Future  - TSH; Future  - Vitamin B12 & Folate; Future    3. PVD (peripheral vascular disease) ([facility] [address]  Asymptomatic  Continue to monitor for signs of claudication.  - Lipid Panel; Future    4. Normocytic anemia  Etiology is unclear  The patient never followed through with seeing the gastroenterologist back in October when his hemoglobin was significantly lower.   FIT testing was negative  Iron studies were normal  - CBC with Auto Differential; Future  - Vitamin B12 & Folate; Future    5. Benign essential HTN  Superbly controlled  Continue Lopressor 25 mg twice daily, lisinopril 20 mg daily  - CBC with Auto Differential; Future  - Comprehensive Metabolic Panel; Future  - Lipid Panel; Future  - TSH; Future    6. CAD in native artery  Condition is asymptomatic  Follow-up with cardiology in June  Continue monitoring for signs of angina or anginal equivalent  - Comprehensive Metabolic Panel; Future  - Lipid Panel; Future    7. Hyperlipidemia, unspecified hyperlipidemia type  Condition control is uncertain. Due for lab  Continue efforts with lifestyle modification  - Comprehensive Metabolic Panel; Future  - Lipid Panel; Future    8. Venous insufficiency of both lower extremities  Patient with more edema on today's exam  Pioglitazone may be contributing  Continue elevation while at rest  Counseled on a no added salt diet    9. B12 deficiency    - CBC with Auto Differential; Future  - Vitamin B12 & Folate; Future    10. Fatigue, unspecified type  Patient likely with sleep apnea  Patient refuses sleep evaluation  Patient counseled on the risks of untreated sleep apnea  - CBC with Auto Differential; Future  - Comprehensive Metabolic Panel; Future  - TSH; Future  - Vitamin B12 & Folate; Future    11. Snoring  As above          Discussed medications with patient who voiced understanding of their use and indication. All questions answered. This note was generated completely or in part utilizing Dragon dictation speech recognition software. Occasionally, words are mistranscribed and despite editing, the text may contain inaccuracies due to incorrect word recognition.   If further clarification is needed please contact the office at [phone]

## 2023-02-28 NOTE — PATIENT INSTRUCTIONS
TO Do List:    #1 Schedule you Vascular follow up:  Zoie Flores, NP    [address].    [address]    [place], [place] [address] [phone] (Work)

## 2023-06-09 ENCOUNTER — OFFICE VISIT (OUTPATIENT)
Dept: INTERNAL MEDICINE CLINIC | Age: 78
End: 2023-06-09

## 2023-06-09 VITALS
WEIGHT: 185 LBS | HEART RATE: 65 BPM | RESPIRATION RATE: 14 BRPM | SYSTOLIC BLOOD PRESSURE: 124 MMHG | DIASTOLIC BLOOD PRESSURE: 60 MMHG | BODY MASS INDEX: 28.04 KG/M2 | HEIGHT: 68 IN | OXYGEN SATURATION: 98 %

## 2023-06-09 DIAGNOSIS — I73.9 PVD (PERIPHERAL VASCULAR DISEASE) (HCC): ICD-10-CM

## 2023-06-09 DIAGNOSIS — I10 BENIGN ESSENTIAL HTN: ICD-10-CM

## 2023-06-09 DIAGNOSIS — I49.9 IRREGULAR HEART BEAT: Primary | ICD-10-CM

## 2023-06-09 DIAGNOSIS — I87.2 VENOUS INSUFFICIENCY OF BOTH LOWER EXTREMITIES: ICD-10-CM

## 2023-06-09 DIAGNOSIS — I25.10 CAD IN NATIVE ARTERY: ICD-10-CM

## 2023-06-09 DIAGNOSIS — R06.83 SNORING: ICD-10-CM

## 2023-06-09 DIAGNOSIS — E78.5 HYPERLIPIDEMIA, UNSPECIFIED HYPERLIPIDEMIA TYPE: ICD-10-CM

## 2023-06-09 DIAGNOSIS — E11.40 TYPE 2 DIABETES MELLITUS WITH DIABETIC NEUROPATHY, WITHOUT LONG-TERM CURRENT USE OF INSULIN (HCC): ICD-10-CM

## 2023-06-09 DIAGNOSIS — E11.59 TYPE 2 DIABETES MELLITUS WITH VASCULAR DISEASE (HCC): ICD-10-CM

## 2023-06-09 NOTE — PROGRESS NOTES
Review of Systems   As per HPI      OBJECTIVE:  Vitals:    06/09/23 0755   BP: 124/60   Pulse: 65   Resp: 14   SpO2: 98%   Weight: 185 lb (83.9 kg)   Height: 5' 8\" (1.727 m)     Body mass index is 28.13 kg/m². Wt Readings from Last 3 Encounters:   06/09/23 185 lb (83.9 kg)   02/28/23 194 lb (88 kg)   10/26/22 190 lb 9.6 oz (86.5 kg)     BP Readings from Last 3 Encounters:   06/09/23 124/60   02/28/23 (!) 124/56   10/26/22 126/62          GEN: NAD, A&O, obese. HEENT:  NC/AT, CYN, EOMI, anicteric. NECK: Supple  No thyromegaly. No JVD. Trachea midline. LYMPH: No C/SC lymphadenopathy  CV: Rhythm is regular. Rate normal.  No murmurs. + Frequent ectopy-- New. PULM: CTA   EXT: 1+ BL LE pitting edema bilateral lower extremities. GI: Abd. Soft, NT, No masses. NEURO:  No focal or lateralizing deficits. No ataxia. Moves all extremities. SKIN:  No rashes. VASC:  Faint pedal pulses, feet are warm. Soft left carotid bruit. ASSESSMENT/PLAN:    1. Type 2 diabetes mellitus with vascular disease ([facility] [address]  2. Type 2 diabetes mellitus with diabetic neuropathy, without long-term current use of insulin (HCC)  Diabetes is well controlled based on last A1c level  Continue to work on lifestyle approach with a carb controlled diet and regular physical activity  Continue metformin and pioglitazone  Monitor for worsening edema and hematuria  - CBC with Auto Differential; Future  - Comprehensive Metabolic Panel; Future  - Lipid Panel; Future  - Hemoglobin A1C; Future    3. Irregular heart beat  Ectopy noted on exam  EKG showed a normal sinus rhythm. Rhythm became regular on exam  Likely PACs or PVCs  - EKG 12 Lead    4. PVD (peripheral vascular disease) ([facility] [address]  Asymptomatic at this time  Follow-up with vascular  Continue risk factor management including good blood pressure and cholesterol control    5.  Benign essential HTN  Blood pressure is well controlled  Continue current dose of lisinopril and

## 2023-06-09 NOTE — PATIENT INSTRUCTIONS
To do list:    #1 follow-up with your vascular specialist (Dr. Kirsten Quintero). It is time to take another look at the carotid artery narrowing. Have them check the pulses in your feet    #2 when you see your cardiologist, mentioned that I heard extra beats and skipped beats (ectopy). Your EKG showed a regular normal rhythm    #3 continue walking.   This is good for your blood vessels in your legs and your heart

## 2023-10-09 ENCOUNTER — OFFICE VISIT (OUTPATIENT)
Dept: INTERNAL MEDICINE CLINIC | Age: 78
End: 2023-10-09
Payer: MEDICARE

## 2023-10-09 VITALS
BODY MASS INDEX: 28.34 KG/M2 | DIASTOLIC BLOOD PRESSURE: 62 MMHG | HEART RATE: 57 BPM | RESPIRATION RATE: 12 BRPM | SYSTOLIC BLOOD PRESSURE: 110 MMHG | HEIGHT: 68 IN | OXYGEN SATURATION: 98 % | WEIGHT: 187 LBS

## 2023-10-09 DIAGNOSIS — I10 BENIGN ESSENTIAL HTN: ICD-10-CM

## 2023-10-09 DIAGNOSIS — E11.40 TYPE 2 DIABETES MELLITUS WITH DIABETIC NEUROPATHY, WITHOUT LONG-TERM CURRENT USE OF INSULIN (HCC): ICD-10-CM

## 2023-10-09 DIAGNOSIS — I65.23 ASYMPTOMATIC BILATERAL CAROTID ARTERY STENOSIS: ICD-10-CM

## 2023-10-09 DIAGNOSIS — E11.59 TYPE 2 DIABETES MELLITUS WITH VASCULAR DISEASE (HCC): Primary | ICD-10-CM

## 2023-10-09 DIAGNOSIS — I25.10 CAD IN NATIVE ARTERY: ICD-10-CM

## 2023-10-09 DIAGNOSIS — I87.2 VENOUS INSUFFICIENCY OF BOTH LOWER EXTREMITIES: ICD-10-CM

## 2023-10-09 DIAGNOSIS — E78.5 HYPERLIPIDEMIA, UNSPECIFIED HYPERLIPIDEMIA TYPE: ICD-10-CM

## 2023-10-09 DIAGNOSIS — I73.9 PVD (PERIPHERAL VASCULAR DISEASE) (HCC): ICD-10-CM

## 2023-10-09 DIAGNOSIS — E11.59 TYPE 2 DIABETES MELLITUS WITH VASCULAR DISEASE (HCC): ICD-10-CM

## 2023-10-09 LAB
CREAT UR-MCNC: 93.6 MG/DL (ref 39–259)
MICROALBUMIN UR DL<=1MG/L-MCNC: 1.5 MG/DL
MICROALBUMIN/CREAT UR: 16 MG/G (ref 0–30)

## 2023-10-09 PROCEDURE — G8417 CALC BMI ABV UP PARAM F/U: HCPCS | Performed by: INTERNAL MEDICINE

## 2023-10-09 PROCEDURE — 1036F TOBACCO NON-USER: CPT | Performed by: INTERNAL MEDICINE

## 2023-10-09 PROCEDURE — 1123F ACP DISCUSS/DSCN MKR DOCD: CPT | Performed by: INTERNAL MEDICINE

## 2023-10-09 PROCEDURE — G8484 FLU IMMUNIZE NO ADMIN: HCPCS | Performed by: INTERNAL MEDICINE

## 2023-10-09 PROCEDURE — 3074F SYST BP LT 130 MM HG: CPT | Performed by: INTERNAL MEDICINE

## 2023-10-09 PROCEDURE — 3044F HG A1C LEVEL LT 7.0%: CPT | Performed by: INTERNAL MEDICINE

## 2023-10-09 PROCEDURE — G8427 DOCREV CUR MEDS BY ELIG CLIN: HCPCS | Performed by: INTERNAL MEDICINE

## 2023-10-09 PROCEDURE — 99214 OFFICE O/P EST MOD 30 MIN: CPT | Performed by: INTERNAL MEDICINE

## 2023-10-09 PROCEDURE — 3078F DIAST BP <80 MM HG: CPT | Performed by: INTERNAL MEDICINE

## 2023-10-09 NOTE — PATIENT INSTRUCTIONS
To do list:    #1 obtain lab tests-ordered and in the system    #2 you might want to check with your cardiologist office regarding the results of the carotid Doppler. It looks stable.   Recommendations for next study per your cardiologist

## 2023-10-09 NOTE — PROGRESS NOTES
CYN, EOMI, anicteric. NECK: Supple  No thyromegaly. No JVD. Trachea midline. LYMPH: No C/SC lymphadenopathy  CV: Rhythm is regular. Rate normal.  No murmurs. No ectopy noted today  EXT: 1+ BL LE pitting edema bilateral lower extremities. GI: Abd. Soft, NT, No masses. NEURO:  No focal or lateralizing deficits. No ataxia. Moves all extremities. SKIN:  No rashes. VASC:  Faint pedal pulses, feet are warm. Soft left carotid bruit. ASSESSMENT/PLAN:    1. Type 2 diabetes mellitus with vascular disease ([address])  2. Type 2 diabetes mellitus with diabetic neuropathy, without long-term current use of insulin ([address])  Control is uncertain  Overdue for lab  He did not obtain lab work ordered back in June  He will have lab work drawn today  Urine microalbuminuria test  Foot exam is up-to-date  Patient does not want to change his medications. I encouraged him to consider starting an SGLT2 inhibitor not only for its diabetic benefits but also cardiac, blood pressure, edema. His pioglitazone may be contributing to some edema  - Microalbumin / Creatinine Urine Ratio    3. PVD (peripheral vascular disease) ([address])  Asymptomatic at this time  Continue to monitor for signs of claudication    4. Benign essential HTN  Blood pressure is well controlled  Continue lisinopril 20 mg daily  Check electrolytes and renal function    5. CAD in native artery  Condition is stable without signs of angina or anginal equivalents  Continue aggressive cardiovascular risk factor management  I recommended he consider starting an SGLT2 inhibitor. Patient declines at this time. We did discuss the benefits of this class of medication. 6. Hyperlipidemia, unspecified hyperlipidemia type  Control is uncertain. Overdue for lab  Continue aggressive LDL reduction  Depending on LDL level, consider changing simvastatin to Crestor    7.  Venous insufficiency of both lower extremities  Stable  Patient does have edema on today's exam as

## 2023-10-10 LAB
ALBUMIN SERPL-MCNC: 4.3 G/DL (ref 3.4–5)
ALBUMIN/GLOB SERPL: 1.7 {RATIO} (ref 1.1–2.2)
ALP SERPL-CCNC: 115 U/L (ref 40–129)
ALT SERPL-CCNC: 11 U/L (ref 10–40)
ANION GAP SERPL CALCULATED.3IONS-SCNC: 13 MMOL/L (ref 3–16)
AST SERPL-CCNC: 19 U/L (ref 15–37)
BASOPHILS # BLD: 0 K/UL (ref 0–0.2)
BASOPHILS NFR BLD: 0.4 %
BILIRUB SERPL-MCNC: 0.4 MG/DL (ref 0–1)
BUN SERPL-MCNC: 23 MG/DL (ref 7–20)
CALCIUM SERPL-MCNC: 10.3 MG/DL (ref 8.3–10.6)
CHLORIDE SERPL-SCNC: 104 MMOL/L (ref 99–110)
CHOLEST SERPL-MCNC: 150 MG/DL (ref 0–199)
CO2 SERPL-SCNC: 21 MMOL/L (ref 21–32)
CREAT SERPL-MCNC: 1.3 MG/DL (ref 0.8–1.3)
DEPRECATED RDW RBC AUTO: 14.6 % (ref 12.4–15.4)
EOSINOPHIL # BLD: 0.1 K/UL (ref 0–0.6)
EOSINOPHIL NFR BLD: 1.9 %
EST. AVERAGE GLUCOSE BLD GHB EST-MCNC: 145.6 MG/DL
GFR SERPLBLD CREATININE-BSD FMLA CKD-EPI: 56 ML/MIN/{1.73_M2}
GLUCOSE SERPL-MCNC: 141 MG/DL (ref 70–99)
HBA1C MFR BLD: 6.7 %
HCT VFR BLD AUTO: 38 % (ref 40.5–52.5)
HDLC SERPL-MCNC: 67 MG/DL (ref 40–60)
HGB BLD-MCNC: 12.3 G/DL (ref 13.5–17.5)
LDLC SERPL CALC-MCNC: 71 MG/DL
LYMPHOCYTES # BLD: 1.8 K/UL (ref 1–5.1)
LYMPHOCYTES NFR BLD: 32.8 %
MCH RBC QN AUTO: 29.8 PG (ref 26–34)
MCHC RBC AUTO-ENTMCNC: 32.4 G/DL (ref 31–36)
MCV RBC AUTO: 92 FL (ref 80–100)
MONOCYTES # BLD: 0.5 K/UL (ref 0–1.3)
MONOCYTES NFR BLD: 9.7 %
NEUTROPHILS # BLD: 3 K/UL (ref 1.7–7.7)
NEUTROPHILS NFR BLD: 55.2 %
PLATELET # BLD AUTO: 181 K/UL (ref 135–450)
PMV BLD AUTO: 9.2 FL (ref 5–10.5)
POTASSIUM SERPL-SCNC: 4.8 MMOL/L (ref 3.5–5.1)
PROT SERPL-MCNC: 6.9 G/DL (ref 6.4–8.2)
RBC # BLD AUTO: 4.12 M/UL (ref 4.2–5.9)
SODIUM SERPL-SCNC: 138 MMOL/L (ref 136–145)
TRIGL SERPL-MCNC: 59 MG/DL (ref 0–150)
VLDLC SERPL CALC-MCNC: 12 MG/DL
WBC # BLD AUTO: 5.4 K/UL (ref 4–11)

## 2023-10-20 DIAGNOSIS — E78.5 HYPERLIPIDEMIA, UNSPECIFIED HYPERLIPIDEMIA TYPE: ICD-10-CM

## 2023-10-20 DIAGNOSIS — E11.59 TYPE 2 DIABETES MELLITUS WITH VASCULAR DISEASE (HCC): ICD-10-CM

## 2023-10-20 DIAGNOSIS — I10 BENIGN ESSENTIAL HTN: ICD-10-CM

## 2023-10-20 DIAGNOSIS — I25.10 CAD IN NATIVE ARTERY: ICD-10-CM

## 2023-10-20 RX ORDER — LISINOPRIL 20 MG/1
TABLET ORAL
Qty: 90 TABLET | Refills: 3 | Status: SHIPPED | OUTPATIENT
Start: 2023-10-20

## 2023-10-20 RX ORDER — SIMVASTATIN 40 MG
TABLET ORAL
Qty: 90 TABLET | Refills: 3 | Status: SHIPPED | OUTPATIENT
Start: 2023-10-20

## 2023-10-20 RX ORDER — PIOGLITAZONEHYDROCHLORIDE 30 MG/1
TABLET ORAL
Qty: 90 TABLET | Refills: 3 | Status: SHIPPED | OUTPATIENT
Start: 2023-10-20

## 2023-10-20 RX ORDER — TAMSULOSIN HYDROCHLORIDE 0.4 MG/1
CAPSULE ORAL
Qty: 90 CAPSULE | Refills: 3 | Status: SHIPPED | OUTPATIENT
Start: 2023-10-20

## 2024-02-05 ENCOUNTER — OFFICE VISIT (OUTPATIENT)
Dept: INTERNAL MEDICINE CLINIC | Age: 79
End: 2024-02-05
Payer: MEDICARE

## 2024-02-05 VITALS
SYSTOLIC BLOOD PRESSURE: 118 MMHG | OXYGEN SATURATION: 98 % | BODY MASS INDEX: 28.79 KG/M2 | HEART RATE: 50 BPM | RESPIRATION RATE: 12 BRPM | HEIGHT: 68 IN | WEIGHT: 190 LBS | DIASTOLIC BLOOD PRESSURE: 66 MMHG

## 2024-02-05 DIAGNOSIS — E53.8 B12 DEFICIENCY: ICD-10-CM

## 2024-02-05 DIAGNOSIS — E11.40 TYPE 2 DIABETES MELLITUS WITH DIABETIC NEUROPATHY, WITHOUT LONG-TERM CURRENT USE OF INSULIN (HCC): ICD-10-CM

## 2024-02-05 DIAGNOSIS — I87.2 VENOUS INSUFFICIENCY OF BOTH LOWER EXTREMITIES: Primary | ICD-10-CM

## 2024-02-05 DIAGNOSIS — I65.23 ASYMPTOMATIC BILATERAL CAROTID ARTERY STENOSIS: ICD-10-CM

## 2024-02-05 DIAGNOSIS — I10 BENIGN ESSENTIAL HTN: ICD-10-CM

## 2024-02-05 DIAGNOSIS — R06.83 SNORING: ICD-10-CM

## 2024-02-05 DIAGNOSIS — E78.5 HYPERLIPIDEMIA, UNSPECIFIED HYPERLIPIDEMIA TYPE: ICD-10-CM

## 2024-02-05 DIAGNOSIS — I73.9 PVD (PERIPHERAL VASCULAR DISEASE) (HCC): ICD-10-CM

## 2024-02-05 DIAGNOSIS — E11.59 TYPE 2 DIABETES MELLITUS WITH VASCULAR DISEASE (HCC): ICD-10-CM

## 2024-02-05 DIAGNOSIS — D64.9 NORMOCYTIC ANEMIA: ICD-10-CM

## 2024-02-05 DIAGNOSIS — R40.0 DAYTIME SLEEPINESS: ICD-10-CM

## 2024-02-05 DIAGNOSIS — R60.0 BILATERAL LEG EDEMA: ICD-10-CM

## 2024-02-05 DIAGNOSIS — I25.10 CAD IN NATIVE ARTERY: ICD-10-CM

## 2024-02-05 PROCEDURE — G2211 COMPLEX E/M VISIT ADD ON: HCPCS | Performed by: INTERNAL MEDICINE

## 2024-02-05 PROCEDURE — 99214 OFFICE O/P EST MOD 30 MIN: CPT | Performed by: INTERNAL MEDICINE

## 2024-02-05 PROCEDURE — 3074F SYST BP LT 130 MM HG: CPT | Performed by: INTERNAL MEDICINE

## 2024-02-05 PROCEDURE — G8484 FLU IMMUNIZE NO ADMIN: HCPCS | Performed by: INTERNAL MEDICINE

## 2024-02-05 PROCEDURE — 3078F DIAST BP <80 MM HG: CPT | Performed by: INTERNAL MEDICINE

## 2024-02-05 PROCEDURE — 1036F TOBACCO NON-USER: CPT | Performed by: INTERNAL MEDICINE

## 2024-02-05 PROCEDURE — G8427 DOCREV CUR MEDS BY ELIG CLIN: HCPCS | Performed by: INTERNAL MEDICINE

## 2024-02-05 PROCEDURE — 1123F ACP DISCUSS/DSCN MKR DOCD: CPT | Performed by: INTERNAL MEDICINE

## 2024-02-05 PROCEDURE — G8417 CALC BMI ABV UP PARAM F/U: HCPCS | Performed by: INTERNAL MEDICINE

## 2024-02-05 RX ORDER — PIOGLITAZONEHYDROCHLORIDE 30 MG/1
15 TABLET ORAL DAILY
Qty: 90 TABLET | Refills: 3 | Status: SHIPPED | OUTPATIENT
Start: 2024-02-05

## 2024-02-05 ASSESSMENT — PATIENT HEALTH QUESTIONNAIRE - PHQ9
1. LITTLE INTEREST OR PLEASURE IN DOING THINGS: 0
SUM OF ALL RESPONSES TO PHQ QUESTIONS 1-9: 0
SUM OF ALL RESPONSES TO PHQ QUESTIONS 1-9: 0
2. FEELING DOWN, DEPRESSED OR HOPELESS: 0
SUM OF ALL RESPONSES TO PHQ QUESTIONS 1-9: 0
SUM OF ALL RESPONSES TO PHQ QUESTIONS 1-9: 0
SUM OF ALL RESPONSES TO PHQ9 QUESTIONS 1 & 2: 0

## 2024-02-05 NOTE — PROGRESS NOTES
reflux study    5. Bilateral leg edema  As above  Rule out high right-sided pressures  Recommend echocardiogram  Recommend sleep study  Could consider a diuretic but it is due mostly to venous insufficiency could be problematic  Continue to monitor on compression therapy  - ECHO Complete 2D W Doppler W Color  - Comprehensive Metabolic Panel; Future  - Lipid Panel; Future    6. Daytime sleepiness  Recommend sleep study again  Patient likely to refuse  - TSH; Future  - External Referral To Sleep Medicine    7. Snoring  As above  Suspect sleep apnea  - External Referral To Sleep Medicine    8. Asymptomatic bilateral carotid artery stenosis  Patient will be due for carotid Doppler in October assuming that his cardiologist recommended a year follow-up    9. Benign essential HTN  Blood pressure is well-controlled  - CBC with Auto Differential; Future  - Comprehensive Metabolic Panel; Future  - Lipid Panel; Future    10. CAD in native artery  Asymptomatic  Patient has not had an echo in about 10 years  Check ejection fraction as well as right-sided pressures  - ECHO Complete 2D W Doppler W Color  - Comprehensive Metabolic Panel; Future  - Lipid Panel; Future    11. Hyperlipidemia, unspecified hyperlipidemia type  Control is uncertain.  Due for lab  Continue statin therapy as part of his overall cardiovascular disease secondary prevention strategy  - Comprehensive Metabolic Panel; Future  - Lipid Panel; Future          I have encouraged patient to discuss the above with his cardiologist        Discussed medications with patient who voiced understanding of their use and indication. All questions answered.      This note was generated completely or in part utilizing Dragon dictation speech recognition software.  Occasionally, words are mistranscribed and despite editing, the text may contain inaccuracies due to incorrect word recognition.  If further clarification is needed please contact the office at [phone]

## 2024-02-05 NOTE — PATIENT INSTRUCTIONS
TO Do List:    #1 Schedule diabetic eye exam    #2 Wear compression stockings daily and remove at bed time

## 2024-04-26 ENCOUNTER — TELEPHONE (OUTPATIENT)
Dept: INTERNAL MEDICINE CLINIC | Age: 79
End: 2024-04-26

## 2024-04-26 NOTE — TELEPHONE ENCOUNTER
Spoke with pt regarding the result of his Echo.  EF has decreased significantly.  Fortunately, he has not been having any more symptoms of SOB, fatigue, orthopnea.  He continues to have swelling.    I advised that he make an appointment with his cardiologist ASAP.    Pt may be a candidate for change to Entresto. May also need Spiranolactone. I would also prefer he change from Actos (recent decrease in dose) to SGLT-2 inhibitor such as Farxiga or Jardiance.

## 2024-08-05 ENCOUNTER — OFFICE VISIT (OUTPATIENT)
Dept: INTERNAL MEDICINE CLINIC | Age: 79
End: 2024-08-05
Payer: MEDICARE

## 2024-08-05 VITALS
SYSTOLIC BLOOD PRESSURE: 132 MMHG | OXYGEN SATURATION: 99 % | DIASTOLIC BLOOD PRESSURE: 54 MMHG | TEMPERATURE: 97.5 F | BODY MASS INDEX: 28.13 KG/M2 | HEART RATE: 57 BPM | WEIGHT: 185 LBS

## 2024-08-05 DIAGNOSIS — I10 BENIGN ESSENTIAL HTN: ICD-10-CM

## 2024-08-05 DIAGNOSIS — E11.59 TYPE 2 DIABETES MELLITUS WITH VASCULAR DISEASE (HCC): Primary | ICD-10-CM

## 2024-08-05 DIAGNOSIS — I25.10 CORONARY ARTERY DISEASE INVOLVING NATIVE CORONARY ARTERY OF NATIVE HEART WITHOUT ANGINA PECTORIS: ICD-10-CM

## 2024-08-05 DIAGNOSIS — I87.2 VENOUS INSUFFICIENCY OF BOTH LOWER EXTREMITIES: ICD-10-CM

## 2024-08-05 DIAGNOSIS — E53.8 B12 DEFICIENCY: ICD-10-CM

## 2024-08-05 DIAGNOSIS — E11.40 TYPE 2 DIABETES MELLITUS WITH DIABETIC NEUROPATHY, WITHOUT LONG-TERM CURRENT USE OF INSULIN (HCC): ICD-10-CM

## 2024-08-05 DIAGNOSIS — I65.23 ASYMPTOMATIC BILATERAL CAROTID ARTERY STENOSIS: ICD-10-CM

## 2024-08-05 DIAGNOSIS — E78.5 HYPERLIPIDEMIA, UNSPECIFIED HYPERLIPIDEMIA TYPE: ICD-10-CM

## 2024-08-05 DIAGNOSIS — E11.59 TYPE 2 DIABETES MELLITUS WITH VASCULAR DISEASE (HCC): ICD-10-CM

## 2024-08-05 LAB
ALBUMIN SERPL-MCNC: 4.3 G/DL (ref 3.4–5)
ALBUMIN/GLOB SERPL: 1.7 {RATIO} (ref 1.1–2.2)
ALP SERPL-CCNC: 105 U/L (ref 40–129)
ALT SERPL-CCNC: 11 U/L (ref 10–40)
ANION GAP SERPL CALCULATED.3IONS-SCNC: 9 MMOL/L (ref 3–16)
AST SERPL-CCNC: 21 U/L (ref 15–37)
BASOPHILS # BLD: 0 K/UL (ref 0–0.2)
BASOPHILS NFR BLD: 0.4 %
BILIRUB SERPL-MCNC: 0.6 MG/DL (ref 0–1)
BUN SERPL-MCNC: 22 MG/DL (ref 7–20)
CALCIUM SERPL-MCNC: 10.9 MG/DL (ref 8.3–10.6)
CHLORIDE SERPL-SCNC: 105 MMOL/L (ref 99–110)
CHOLEST SERPL-MCNC: 150 MG/DL (ref 0–199)
CO2 SERPL-SCNC: 25 MMOL/L (ref 21–32)
CREAT SERPL-MCNC: 1.2 MG/DL (ref 0.8–1.3)
DEPRECATED RDW RBC AUTO: 14 % (ref 12.4–15.4)
EOSINOPHIL # BLD: 0.1 K/UL (ref 0–0.6)
EOSINOPHIL NFR BLD: 2.3 %
GFR SERPLBLD CREATININE-BSD FMLA CKD-EPI: 62 ML/MIN/{1.73_M2}
GLUCOSE SERPL-MCNC: 134 MG/DL (ref 70–99)
HCT VFR BLD AUTO: 38.4 % (ref 40.5–52.5)
HDLC SERPL-MCNC: 70 MG/DL (ref 40–60)
HGB BLD-MCNC: 12.8 G/DL (ref 13.5–17.5)
LDLC SERPL CALC-MCNC: 65 MG/DL
LYMPHOCYTES # BLD: 1.9 K/UL (ref 1–5.1)
LYMPHOCYTES NFR BLD: 35.6 %
MCH RBC QN AUTO: 30.7 PG (ref 26–34)
MCHC RBC AUTO-ENTMCNC: 33.2 G/DL (ref 31–36)
MCV RBC AUTO: 92.5 FL (ref 80–100)
MONOCYTES # BLD: 0.5 K/UL (ref 0–1.3)
MONOCYTES NFR BLD: 9.8 %
NEUTROPHILS # BLD: 2.8 K/UL (ref 1.7–7.7)
NEUTROPHILS NFR BLD: 51.9 %
PLATELET # BLD AUTO: 174 K/UL (ref 135–450)
PMV BLD AUTO: 9.2 FL (ref 5–10.5)
POTASSIUM SERPL-SCNC: 5 MMOL/L (ref 3.5–5.1)
PROT SERPL-MCNC: 6.9 G/DL (ref 6.4–8.2)
RBC # BLD AUTO: 4.15 M/UL (ref 4.2–5.9)
SODIUM SERPL-SCNC: 139 MMOL/L (ref 136–145)
TRIGL SERPL-MCNC: 73 MG/DL (ref 0–150)
TSH SERPL DL<=0.005 MIU/L-ACNC: 3.54 UIU/ML (ref 0.27–4.2)
VLDLC SERPL CALC-MCNC: 15 MG/DL
WBC # BLD AUTO: 5.4 K/UL (ref 4–11)

## 2024-08-05 PROCEDURE — G8417 CALC BMI ABV UP PARAM F/U: HCPCS | Performed by: INTERNAL MEDICINE

## 2024-08-05 PROCEDURE — 3078F DIAST BP <80 MM HG: CPT | Performed by: INTERNAL MEDICINE

## 2024-08-05 PROCEDURE — 1036F TOBACCO NON-USER: CPT | Performed by: INTERNAL MEDICINE

## 2024-08-05 PROCEDURE — G2211 COMPLEX E/M VISIT ADD ON: HCPCS | Performed by: INTERNAL MEDICINE

## 2024-08-05 PROCEDURE — 99214 OFFICE O/P EST MOD 30 MIN: CPT | Performed by: INTERNAL MEDICINE

## 2024-08-05 PROCEDURE — 3075F SYST BP GE 130 - 139MM HG: CPT | Performed by: INTERNAL MEDICINE

## 2024-08-05 PROCEDURE — 1123F ACP DISCUSS/DSCN MKR DOCD: CPT | Performed by: INTERNAL MEDICINE

## 2024-08-05 PROCEDURE — G8427 DOCREV CUR MEDS BY ELIG CLIN: HCPCS | Performed by: INTERNAL MEDICINE

## 2024-08-05 SDOH — ECONOMIC STABILITY: FOOD INSECURITY: WITHIN THE PAST 12 MONTHS, YOU WORRIED THAT YOUR FOOD WOULD RUN OUT BEFORE YOU GOT MONEY TO BUY MORE.: NEVER TRUE

## 2024-08-05 SDOH — ECONOMIC STABILITY: FOOD INSECURITY: WITHIN THE PAST 12 MONTHS, THE FOOD YOU BOUGHT JUST DIDN'T LAST AND YOU DIDN'T HAVE MONEY TO GET MORE.: NEVER TRUE

## 2024-08-05 SDOH — ECONOMIC STABILITY: INCOME INSECURITY: HOW HARD IS IT FOR YOU TO PAY FOR THE VERY BASICS LIKE FOOD, HOUSING, MEDICAL CARE, AND HEATING?: NOT HARD AT ALL

## 2024-08-05 NOTE — PATIENT INSTRUCTIONS
To do list:    #1 obtain your carotid Doppler sometime after 10/2/2024 in preparation for your visit with Dr. Loya.  Obtain the test at [facility]

## 2024-08-05 NOTE — PROGRESS NOTES
than usual  Patient declines compression stockings  Recommended elevation while at rest  Recommended no added salt diet    4. Asymptomatic bilateral carotid artery stenosis  Stability is uncertain  Patient will be due for repeat carotid Doppler after 10/2/2024  May need more advanced imaging such as a CTA or MRA  Patient will be visiting with a new cardiologist.  - Lipid Panel; Future  - Vascular duplex carotid bilateral; Future    5. Coronary artery disease involving native coronary artery of native heart without angina pectoris  Asymptomatic  Continue to monitor for signs of angina and anginal equivalents  Continue aggressive cardiovascular risk factor management  Patient will follow-up with cardiology  - Lipid Panel; Future    6. Benign essential HTN  Blood pressure is fairly well-controlled at 132/54  Patient will continue his lisinopril 20 mg daily, metoprolol tartrate 25 mg twice daily  Consider adding SGLT2 inhibitor  - CBC with Auto Differential; Future  - Comprehensive Metabolic Panel; Future  - Lipid Panel; Future  - TSH; Future    7. Hyperlipidemia, unspecified hyperlipidemia type  Stability and control are uncertain.  Due for lab  Continue simvastatin 40 mg daily as part of his overall cardiovascular disease risk reduction strategy.  Consideration for changing to a high intensity statin.  I will leave this up to cardiology.  - Comprehensive Metabolic Panel; Future  - Lipid Panel; Future  - TSH; Future    8. B12 deficiency    - Vitamin B12 & Folate; Future              I have encouraged patient to discuss the above with his cardiologist        Discussed medications with patient who voiced understanding of their use and indication. All questions answered.      This note was generated completely or in part utilizing Dragon dictation speech recognition software.  Occasionally, words are mistranscribed and despite editing, the text may contain inaccuracies due to incorrect word recognition.  If further

## 2024-08-06 LAB
CREAT UR-MCNC: 90.1 MG/DL (ref 39–259)
EST. AVERAGE GLUCOSE BLD GHB EST-MCNC: 145.6 MG/DL
FOLATE SERPL-MCNC: 11.94 NG/ML (ref 4.78–24.2)
HBA1C MFR BLD: 6.7 %
MICROALBUMIN UR DL<=1MG/L-MCNC: <1.2 MG/DL
MICROALBUMIN/CREAT UR: NORMAL MG/G (ref 0–30)
VIT B12 SERPL-MCNC: 848 PG/ML (ref 211–911)

## 2024-08-24 DIAGNOSIS — I10 BENIGN ESSENTIAL HTN: ICD-10-CM

## 2024-08-24 DIAGNOSIS — E11.59 TYPE 2 DIABETES MELLITUS WITH VASCULAR DISEASE (HCC): ICD-10-CM

## 2024-08-24 DIAGNOSIS — E78.5 HYPERLIPIDEMIA, UNSPECIFIED HYPERLIPIDEMIA TYPE: ICD-10-CM

## 2024-08-24 DIAGNOSIS — I25.10 CAD IN NATIVE ARTERY: ICD-10-CM

## 2024-08-26 RX ORDER — SIMVASTATIN 40 MG
TABLET ORAL
Qty: 90 TABLET | Refills: 3 | Status: SHIPPED | OUTPATIENT
Start: 2024-08-26

## 2024-08-26 RX ORDER — TAMSULOSIN HYDROCHLORIDE 0.4 MG/1
CAPSULE ORAL
Qty: 90 CAPSULE | Refills: 3 | Status: SHIPPED | OUTPATIENT
Start: 2024-08-26

## 2024-08-26 RX ORDER — METOPROLOL TARTRATE 25 MG/1
TABLET, FILM COATED ORAL
Qty: 180 TABLET | Refills: 3 | Status: SHIPPED | OUTPATIENT
Start: 2024-08-26

## 2024-08-26 RX ORDER — LISINOPRIL 20 MG/1
TABLET ORAL
Qty: 90 TABLET | Refills: 3 | Status: SHIPPED | OUTPATIENT
Start: 2024-08-26

## 2024-08-26 RX ORDER — PIOGLITAZONEHYDROCHLORIDE 30 MG/1
30 TABLET ORAL DAILY
Qty: 90 TABLET | Refills: 3 | Status: SHIPPED | OUTPATIENT
Start: 2024-08-26

## 2024-08-26 NOTE — TELEPHONE ENCOUNTER
Last appointment: 8/5/2024  Next appointment: 12/5/2024  Last refill:   Lisinopril, Metformin, Metoprolol, Simvastatin, Tamsulosin: 10/20/2023  Pioglitazone: 02/05/2024

## 2024-10-18 ENCOUNTER — TELEPHONE (OUTPATIENT)
Dept: INTERNAL MEDICINE CLINIC | Age: 79
End: 2024-10-18

## 2024-10-18 DIAGNOSIS — I65.23 ASYMPTOMATIC BILATERAL CAROTID ARTERY STENOSIS: Primary | ICD-10-CM

## 2024-12-05 ENCOUNTER — OFFICE VISIT (OUTPATIENT)
Dept: INTERNAL MEDICINE CLINIC | Age: 79
End: 2024-12-05

## 2024-12-05 VITALS
BODY MASS INDEX: 28.74 KG/M2 | WEIGHT: 189 LBS | DIASTOLIC BLOOD PRESSURE: 60 MMHG | OXYGEN SATURATION: 98 % | TEMPERATURE: 96.9 F | HEART RATE: 65 BPM | SYSTOLIC BLOOD PRESSURE: 120 MMHG

## 2024-12-05 DIAGNOSIS — E11.59 TYPE 2 DIABETES MELLITUS WITH VASCULAR DISEASE (HCC): Primary | ICD-10-CM

## 2024-12-05 DIAGNOSIS — E11.40 TYPE 2 DIABETES MELLITUS WITH DIABETIC NEUROPATHY, WITHOUT LONG-TERM CURRENT USE OF INSULIN (HCC): ICD-10-CM

## 2024-12-05 DIAGNOSIS — I25.10 CAD IN NATIVE ARTERY: ICD-10-CM

## 2024-12-05 DIAGNOSIS — I10 BENIGN ESSENTIAL HTN: ICD-10-CM

## 2024-12-05 DIAGNOSIS — E53.8 B12 DEFICIENCY: ICD-10-CM

## 2024-12-05 DIAGNOSIS — I65.23 ASYMPTOMATIC BILATERAL CAROTID ARTERY STENOSIS: ICD-10-CM

## 2024-12-05 DIAGNOSIS — E11.59 TYPE 2 DIABETES MELLITUS WITH VASCULAR DISEASE (HCC): ICD-10-CM

## 2024-12-05 DIAGNOSIS — I87.2 VENOUS INSUFFICIENCY OF BOTH LOWER EXTREMITIES: ICD-10-CM

## 2024-12-05 DIAGNOSIS — E78.5 HYPERLIPIDEMIA, UNSPECIFIED HYPERLIPIDEMIA TYPE: ICD-10-CM

## 2024-12-05 NOTE — PATIENT INSTRUCTIONS
To do list:    #1 ask your cardiologist whether or not he thinks you should change your simvastatin to atorvastatin or rosuvastatin    #2 I recommend compression stockings to control your swelling.  Over time this is can make your neuropathy worse and may increase your risk for developing wounds    #3 consider changing pioglitazone to Farxiga or Jardiance.  This would likely help with your swelling while controlling your diabetes

## 2024-12-05 NOTE — PROGRESS NOTES
[facility] Physicians  Internal Medicine  Patient Encounter  ePng Roberson D.O., [facility]           Chief Complaint   Patient presents with    Check-Up    Medication Check       HPI: 79 y.o. male with multiple medical problems seen today regarding the status of his current chronic medical problems below along with a medication review and reconciliation.      Patient offers no new concerns.  Patient feels he has been stable      Preventative healthcare items:  Patient refuses flu vaccine, shingles vaccine, RSV vaccine, Medicare annual wellness visit.      Diabetes Mellitus Type II, Follow-up--   Lab Results   Component Value Date    LABA1C 6.7 08/05/2024   His diabetes has come under better control the last 12 months.  He does take Actos (Could be contributing to swelling).  He has been on for several years.   He never had his lab completed ordered back in Feb.    Last Eye Exam--4/25/2024  U.Microalbumin/Cr--8/5/2024  Last Foot exam--2/5/2024  Pt is on ACEI --Lisinopril.    +ASA 81 mg daily   Complications--Neuropathy, PVD.    No tobacco use  LDL--65  + Statin    HTN--  He denies any headaches, lightheadedness, syncope.  He denies any episodes of unilateral weakness, speech or visual disturbances.  He has never had a stroke or TIA      Hyperlipidemia--   Lab Results   Component Value Date    LDL 65 08/05/2024      CAD-- Previous history----->  H/O CABG 10/2011.  Angiogram 4/29/2013.   He sees Dr. Delvalle 6/26/2024.  His last Echo 4/22/204--->  EF 40%. He is on Zocor.  Should he be on Lipitor or Crestor??  He denies CP, SOB, orthopnea.  He does have swelling that waxes and wanes.  Patient refuses to wear compression stockings.  He does snore and falls asleep during the day.  He has declined a sleep evaluation.      Carotid stenosis-- Doppler showed worsening disease on the left.  Referred to vascular.  He was seen by an NP.  CTA neck performed 2/7/2022--->Right 36% stenosis.   Left ICA 65% which is a lot different

## 2024-12-06 LAB
BACTERIA URNS QL MICRO: NORMAL /HPF
BILIRUB UR QL STRIP.AUTO: NEGATIVE
CLARITY UR: CLEAR
COLOR UR: YELLOW
EPI CELLS #/AREA URNS AUTO: 0 /HPF (ref 0–5)
GLUCOSE UR STRIP.AUTO-MCNC: NEGATIVE MG/DL
HGB UR QL STRIP.AUTO: NEGATIVE
HYALINE CASTS #/AREA URNS AUTO: 0 /LPF (ref 0–8)
KETONES UR STRIP.AUTO-MCNC: NEGATIVE MG/DL
LEUKOCYTE ESTERASE UR QL STRIP.AUTO: NEGATIVE
NITRITE UR QL STRIP.AUTO: NEGATIVE
PH UR STRIP.AUTO: 6.5 [PH] (ref 5–8)
PROT UR STRIP.AUTO-MCNC: NEGATIVE MG/DL
RBC CLUMPS #/AREA URNS AUTO: 1 /HPF (ref 0–4)
SP GR UR STRIP.AUTO: 1.01 (ref 1–1.03)
UA DIPSTICK W REFLEX MICRO PNL UR: NORMAL
URN SPEC COLLECT METH UR: NORMAL
UROBILINOGEN UR STRIP-ACNC: 0.2 E.U./DL
WBC #/AREA URNS AUTO: 0 /HPF (ref 0–5)

## 2024-12-09 DIAGNOSIS — I10 BENIGN ESSENTIAL HTN: ICD-10-CM

## 2024-12-09 DIAGNOSIS — E78.5 HYPERLIPIDEMIA, UNSPECIFIED HYPERLIPIDEMIA TYPE: ICD-10-CM

## 2024-12-09 DIAGNOSIS — I25.10 CAD IN NATIVE ARTERY: ICD-10-CM

## 2024-12-09 DIAGNOSIS — E53.8 B12 DEFICIENCY: ICD-10-CM

## 2024-12-09 DIAGNOSIS — E11.59 TYPE 2 DIABETES MELLITUS WITH VASCULAR DISEASE (HCC): Primary | ICD-10-CM

## 2024-12-12 LAB
A/G RATIO: 1.6 (ref 1–2.1)
ALBUMIN: 3.8 G/DL (ref 3.5–5)
ALP BLD-CCNC: 99 U/L (ref 33–140)
ALT SERPL-CCNC: 11 U/L (ref 0–60)
ANION GAP SERPL CALCULATED.3IONS-SCNC: 8 MMOL/L (ref 5–13)
AST SERPL-CCNC: 17 U/L (ref 0–40)
BASOPHILS ABSOLUTE: 0 10*3/UL (ref 0–0.2)
BASOPHILS RELATIVE PERCENT: 0.2 %
BILIRUB SERPL-MCNC: 0.5 MG/DL (ref 0.2–1.2)
BUN / CREAT RATIO: 19
BUN BLDV-MCNC: 23 MG/DL (ref 7–25)
CALCIUM SERPL-MCNC: 10.1 MG/DL (ref 8.5–10.5)
CHLORIDE BLD-SCNC: 109 MMOL/L (ref 98–110)
CHOLESTEROL, TOTAL: 135 MG/DL (ref 125–199)
CO2: 23 MMOL/L (ref 22–29)
CREAT SERPL-MCNC: 1.22 MG/DL (ref 0.5–1.3)
EGFR (CKD-EPI): 60 SEE NOTE
EOSINOPHILS ABSOLUTE: 0.1 10*3/UL (ref 0–0.5)
EOSINOPHILS RELATIVE PERCENT: 3.1 %
ESTIMATED AVERAGE GLUCOSE: 148 MG/DL (ref 68–114)
GLOBULIN: 2.4 G/DL (ref 2–3.7)
GLUCOSE BLD-MCNC: 128 MG/DL (ref 71–99)
GRANULOCYTE ABSOLUTE COUNT: 2.1 10*3/UL (ref 1.5–7.8)
HBA1C MFR BLD: 6.8 % (ref 4–5.6)
HCT VFR BLD CALC: 37 % (ref 40–51)
HDLC SERPL-MCNC: 64 MG/DL (ref 40–180)
HEMOGLOBIN: 11.3 G/DL (ref 13.2–17.1)
IMMATURE GRANULOCYTES %: 0 10*3/UL (ref 0–0.1)
IMMATURE GRANULOCYTES %: 0.2 % (ref 0–2)
LDL CHOLESTEROL: 59 MG/DL (ref 0–100)
LEUKOCYTES, BLD: 4.1 10*3/UL (ref 3.8–10.8)
LYMPHOCYTES ABSOLUTE: 1.4 10*3/UL (ref 0.8–3.9)
LYMPHOCYTES RELATIVE PERCENT: 34.3 %
MCH RBC QN AUTO: 30.2 PG (ref 27–33)
MCHC RBC AUTO-ENTMCNC: 30.5 G/DL (ref 30–36)
MCV RBC AUTO: 98.9 FL (ref 80–100)
MONOCYTES ABSOLUTE: 0.5 10*3/UL (ref 0.2–0.9)
MONOCYTES RELATIVE PERCENT: 11.6 %
NEUTROPHILS RELATIVE PERCENT: 50.6 %
NONHDLC SERPL-MCNC: 71 MG/DL (ref 0–129)
PDW BLD-RTO: 13.6 % (ref 11–15)
PLATELET # BLD: 133 10*3/UL (ref 140–400)
PMV BLD AUTO: 10.6 FL (ref 9–13)
POTASSIUM SERPL-SCNC: 4.6 MMOL/L (ref 3.5–5.1)
PROTEIN FLUID: 6.2 G/DL (ref 6–8)
RBC # BLD: 3.74 10*6/UL (ref 4.2–5.8)
SODIUM BLD-SCNC: 140 MMOL/L (ref 135–146)
TRIGL SERPL-MCNC: 60 MG/DL (ref 0–149)
VITAMIN B-12: 686 PG/ML (ref 213–816)

## 2024-12-15 DIAGNOSIS — D64.9 NORMOCYTIC ANEMIA: Primary | ICD-10-CM

## 2024-12-15 DIAGNOSIS — D69.6 THROMBOCYTOPENIA (HCC): ICD-10-CM

## 2024-12-21 DIAGNOSIS — D64.9 ANEMIA, UNSPECIFIED TYPE: Primary | ICD-10-CM

## 2025-04-07 ENCOUNTER — OFFICE VISIT (OUTPATIENT)
Dept: INTERNAL MEDICINE CLINIC | Age: 80
End: 2025-04-07
Payer: MEDICARE

## 2025-04-07 VITALS
DIASTOLIC BLOOD PRESSURE: 60 MMHG | SYSTOLIC BLOOD PRESSURE: 100 MMHG | HEART RATE: 66 BPM | OXYGEN SATURATION: 99 % | WEIGHT: 176.8 LBS | HEIGHT: 68 IN | BODY MASS INDEX: 26.8 KG/M2 | TEMPERATURE: 97.7 F

## 2025-04-07 DIAGNOSIS — R40.0 DAYTIME SLEEPINESS: ICD-10-CM

## 2025-04-07 DIAGNOSIS — D64.9 NORMOCYTIC ANEMIA: ICD-10-CM

## 2025-04-07 DIAGNOSIS — E53.8 B12 DEFICIENCY: ICD-10-CM

## 2025-04-07 DIAGNOSIS — I25.10 CORONARY ARTERY DISEASE INVOLVING NATIVE CORONARY ARTERY OF NATIVE HEART WITHOUT ANGINA PECTORIS: ICD-10-CM

## 2025-04-07 DIAGNOSIS — R06.83 SNORING: ICD-10-CM

## 2025-04-07 DIAGNOSIS — I87.2 VENOUS INSUFFICIENCY OF BOTH LOWER EXTREMITIES: ICD-10-CM

## 2025-04-07 DIAGNOSIS — I10 BENIGN ESSENTIAL HTN: ICD-10-CM

## 2025-04-07 DIAGNOSIS — I73.9 PVD (PERIPHERAL VASCULAR DISEASE): Primary | ICD-10-CM

## 2025-04-07 DIAGNOSIS — E78.5 HYPERLIPIDEMIA, UNSPECIFIED HYPERLIPIDEMIA TYPE: ICD-10-CM

## 2025-04-07 DIAGNOSIS — E11.59 TYPE 2 DIABETES MELLITUS WITH VASCULAR DISEASE (HCC): ICD-10-CM

## 2025-04-07 DIAGNOSIS — E11.40 TYPE 2 DIABETES MELLITUS WITH DIABETIC NEUROPATHY, WITHOUT LONG-TERM CURRENT USE OF INSULIN (HCC): ICD-10-CM

## 2025-04-07 DIAGNOSIS — I50.22 CHRONIC SYSTOLIC CHF (CONGESTIVE HEART FAILURE) (HCC): ICD-10-CM

## 2025-04-07 PROCEDURE — 99215 OFFICE O/P EST HI 40 MIN: CPT | Performed by: INTERNAL MEDICINE

## 2025-04-07 PROCEDURE — 1159F MED LIST DOCD IN RCRD: CPT | Performed by: INTERNAL MEDICINE

## 2025-04-07 PROCEDURE — G2211 COMPLEX E/M VISIT ADD ON: HCPCS | Performed by: INTERNAL MEDICINE

## 2025-04-07 PROCEDURE — 3078F DIAST BP <80 MM HG: CPT | Performed by: INTERNAL MEDICINE

## 2025-04-07 PROCEDURE — G8417 CALC BMI ABV UP PARAM F/U: HCPCS | Performed by: INTERNAL MEDICINE

## 2025-04-07 PROCEDURE — 1036F TOBACCO NON-USER: CPT | Performed by: INTERNAL MEDICINE

## 2025-04-07 PROCEDURE — G8427 DOCREV CUR MEDS BY ELIG CLIN: HCPCS | Performed by: INTERNAL MEDICINE

## 2025-04-07 PROCEDURE — 3074F SYST BP LT 130 MM HG: CPT | Performed by: INTERNAL MEDICINE

## 2025-04-07 PROCEDURE — 1123F ACP DISCUSS/DSCN MKR DOCD: CPT | Performed by: INTERNAL MEDICINE

## 2025-04-07 RX ORDER — SPIRONOLACTONE 25 MG/1
25 TABLET ORAL DAILY
COMMUNITY
Start: 2025-02-19

## 2025-04-07 RX ORDER — EMPAGLIFLOZIN 25 MG/1
25 TABLET, FILM COATED ORAL DAILY
COMMUNITY
Start: 2025-02-19 | End: 2025-08-18

## 2025-04-07 SDOH — ECONOMIC STABILITY: FOOD INSECURITY: WITHIN THE PAST 12 MONTHS, YOU WORRIED THAT YOUR FOOD WOULD RUN OUT BEFORE YOU GOT MONEY TO BUY MORE.: NEVER TRUE

## 2025-04-07 SDOH — ECONOMIC STABILITY: FOOD INSECURITY: WITHIN THE PAST 12 MONTHS, THE FOOD YOU BOUGHT JUST DIDN'T LAST AND YOU DIDN'T HAVE MONEY TO GET MORE.: NEVER TRUE

## 2025-04-07 ASSESSMENT — PATIENT HEALTH QUESTIONNAIRE - PHQ9
1. LITTLE INTEREST OR PLEASURE IN DOING THINGS: NOT AT ALL
SUM OF ALL RESPONSES TO PHQ QUESTIONS 1-9: 0
2. FEELING DOWN, DEPRESSED OR HOPELESS: NOT AT ALL
SUM OF ALL RESPONSES TO PHQ QUESTIONS 1-9: 0

## 2025-04-07 NOTE — PROGRESS NOTES
[facility] Physicians  Internal Medicine  Patient Encounter  Peng Roberson D.O., [facility]           Chief Complaint   Patient presents with    Follow-up     4 month heart Check up       HPI: 79 y.o. male with multiple medical problems seen today regarding the status of his current chronic medical problems below along with a medication review and reconciliation.      Patient offers no new concerns.  Patient feels he has been stable      Preventative healthcare items:  Patient refuses flu vaccine, shingles vaccine, RSV vaccine, Medicare annual wellness visit.      Diabetes Mellitus Type II, Follow-up--   Lab Results   Component Value Date    LABA1C 6.8 (H) 12/12/2024   He does not check his sugars.  He denies poly's.  He is voiding more with the Jardiance.    Last Eye Exam--4/25/2024, Scheduled  U.Microalbumin/Cr--8/5/2024  Last Foot exam--2/5/2024  Pt is on ACEI --Lisinopril.    +ASA 81 mg daily   Complications--Neuropathy, PVD.    No tobacco use  LDL--59  + Statin    HTN--  He denies any headaches, lightheadedness, syncope.  He denies any episodes of unilateral weakness, speech or visual disturbances.  He has never had a stroke or TIA.  He does not check BP at home.        Hyperlipidemia--   Lab Results   Component Value Date    LDL 59 12/12/2024    He denies myalgias or weakness.     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  Angiogram 4/29/2013.   He sees Dr. Delvalle 6/26/2024.  He is now seeing Dr. Loya.  He is not optimizing heart failure therapy.  Pt has declined change in therapy in the past, namely adding SGLT-2 inhib.  He was seen 12/18/2024.  Echo 3/6/2025---->EF 43%, Mod MR.    Lisinopril was stopped.   Entresto was started.  Actos was stopped and Jardiance was started.  Pt states he gets easily fatigued which is new.  He denies CP, SOB with exertion.  He denies orthopnea or increased swelling.   He may have another echo scheduled.  He states the want to see another echo on the new medication.

## 2025-04-07 NOTE — PATIENT INSTRUCTIONS
To do list:    #1 strongly recommend you see a sleep specialist and have a sleep evaluation.  You can discuss this recommendation with your cardiologist.    #2 have a lower extremity artery study

## 2025-05-21 LAB
A/G RATIO: 1.3 (ref 1–2.1)
ALBUMIN: 3.7 G/DL (ref 3.5–5)
ALP BLD-CCNC: 87 U/L (ref 33–140)
ALT SERPL-CCNC: 8 U/L (ref 0–60)
ANION GAP SERPL CALCULATED.3IONS-SCNC: 10 MMOL/L (ref 5–13)
AST SERPL-CCNC: 22 U/L (ref 0–40)
BASOPHILS ABSOLUTE: 0 10*3/UL (ref 0–0.2)
BASOPHILS RELATIVE PERCENT: 0.2 %
BILIRUB SERPL-MCNC: 0.5 MG/DL (ref 0.2–1.2)
BUN / CREAT RATIO: 20
BUN BLDV-MCNC: 28 MG/DL (ref 7–25)
CALCIUM SERPL-MCNC: 10.3 MG/DL (ref 8.5–10.5)
CHLORIDE BLD-SCNC: 109 MMOL/L (ref 98–110)
CHOLESTEROL, TOTAL: 194 MG/DL (ref 125–199)
CO2: 21 MMOL/L (ref 22–29)
CREAT SERPL-MCNC: 1.37 MG/DL (ref 0.5–1.3)
EGFR (CKD-EPI): 52 SEE NOTE
EOSINOPHILS ABSOLUTE: 0.1 10*3/UL (ref 0–0.5)
EOSINOPHILS RELATIVE PERCENT: 1.7 %
ESTIMATED AVERAGE GLUCOSE: 166 MG/DL (ref 68–114)
FERRITIN: 30 NG/ML (ref 20–250)
FOLATE: 8.5 NG/ML (ref 7–31.4)
GLOBULIN: 2.8 G/DL (ref 2–3.7)
GLUCOSE BLD-MCNC: 135 MG/DL (ref 71–99)
GRANULOCYTE ABSOLUTE COUNT: 2.2 10*3/UL (ref 1.5–7.8)
HBA1C MFR BLD: 7.4 % (ref 4–5.6)
HCT VFR BLD CALC: 41.8 % (ref 40–51)
HDLC SERPL-MCNC: 52 MG/DL (ref 40–180)
HEMOGLOBIN: 13.1 G/DL (ref 13.2–17.1)
IMMATURE GRANULOCYTES %: 0 %
IMMATURE GRANULOCYTES %: 0 10*3/UL (ref 0–0.1)
IRON % SATURATION: 18 % (ref 20–50)
IRON: 76 UG/DL (ref 65–175)
LDL CHOLESTEROL: 112 MG/DL (ref 0–100)
LEUKOCYTES, BLD: 4.2 10*3/UL (ref 4–12)
LYMPHOCYTES ABSOLUTE: 1.5 10*3/UL (ref 0.8–3.9)
LYMPHOCYTES RELATIVE PERCENT: 36.3 %
MCH RBC QN AUTO: 30 PG (ref 27–33)
MCHC RBC AUTO-ENTMCNC: 31.3 G/DL (ref 30–36)
MCV RBC AUTO: 95.9 FL (ref 80–100)
MONOCYTES ABSOLUTE: 0.4 10*3/UL (ref 0.2–0.9)
MONOCYTES RELATIVE PERCENT: 9 %
NEUTROPHILS RELATIVE PERCENT: 52.8 %
NONHDLC SERPL-MCNC: 142 MG/DL (ref 0–129)
PDW BLD-RTO: 13.2 % (ref 11–15)
PLATELET # BLD: 214 10*3/UL (ref 140–400)
PMV BLD AUTO: 10.4 FL (ref 9–13)
POTASSIUM SERPL-SCNC: 4.9 MMOL/L (ref 3.5–5.1)
PROTEIN FLUID: 6.5 G/DL (ref 6–8)
RBC # BLD: 4.36 10*6/UL (ref 4.2–5.8)
SODIUM BLD-SCNC: 140 MMOL/L (ref 135–146)
TOTAL IRON BINDING CAPACITY: 425 UG/DL (ref 250–450)
TRIGL SERPL-MCNC: 152 MG/DL (ref 0–149)
TSH ULTRASENSITIVE: 3.92 UIU/ML (ref 0.35–4.94)
VITAMIN B-12: 937 PG/ML (ref 213–816)

## 2025-05-22 ENCOUNTER — RESULTS FOLLOW-UP (OUTPATIENT)
Dept: INTERNAL MEDICINE CLINIC | Age: 80
End: 2025-05-22

## 2025-05-22 DIAGNOSIS — E11.59 TYPE 2 DIABETES MELLITUS WITH VASCULAR DISEASE (HCC): ICD-10-CM

## 2025-05-22 DIAGNOSIS — E78.5 HYPERLIPIDEMIA, UNSPECIFIED HYPERLIPIDEMIA TYPE: ICD-10-CM

## 2025-05-22 DIAGNOSIS — N18.31 STAGE 3A CHRONIC KIDNEY DISEASE (HCC): Primary | ICD-10-CM

## 2025-05-22 LAB
A/G RATIO: 1.2 (ref 0.7–1.7)
ALPHA 2: 0.7 G/DL (ref 0.4–1)
ALPHA-1-GLOBULIN: 0.2 G/DL (ref 0–0.4)
BETA GLOBULIN: 1 G/DL (ref 0.7–1.3)
GAMMA GLOBULIN: 0.8 G/DL (ref 0.4–1.8)
GLOBULIN: 2.7 G/DL (ref 2.2–3.9)
Lab: 3.3 G/DL (ref 2.9–4.4)
M SPIKE: NORMAL G/DL
TOTAL PROTEIN: 6 G/DL (ref 6–8.5)

## 2025-05-23 RX ORDER — ROSUVASTATIN CALCIUM 20 MG/1
20 TABLET, COATED ORAL NIGHTLY
Qty: 90 TABLET | Refills: 3 | Status: SHIPPED | OUTPATIENT
Start: 2025-05-23

## 2025-05-28 ENCOUNTER — TELEPHONE (OUTPATIENT)
Dept: INTERNAL MEDICINE CLINIC | Age: 80
End: 2025-05-28

## 2025-05-28 DIAGNOSIS — I73.9 PVD (PERIPHERAL VASCULAR DISEASE): Primary | ICD-10-CM

## 2025-05-28 DIAGNOSIS — I70.90 ATHEROSCLEROSIS: ICD-10-CM

## 2025-05-28 NOTE — TELEPHONE ENCOUNTER
Chasity from [facility] called to get a new diagnostic code for a Vascular Lower Bilat Ultrasound because the order is triggering an \"ABN\" code (against billing code) that's not covered by Medicare. Please send a different diagnostic code that's covered under medicare, thx

## 2025-06-03 ENCOUNTER — CLINICAL DOCUMENTATION (OUTPATIENT)
Age: 80
End: 2025-06-03

## 2025-06-03 NOTE — PROGRESS NOTES
Appt made for 7/2/2025  Labs and notes reviewed    Thanks for the referral.    Nephrology  [address] [place] [address]  Office: [phone]  Fax: [phone]

## 2025-08-27 ENCOUNTER — OFFICE VISIT (OUTPATIENT)
Dept: INTERNAL MEDICINE CLINIC | Age: 80
End: 2025-08-27

## 2025-08-27 VITALS
DIASTOLIC BLOOD PRESSURE: 60 MMHG | HEART RATE: 57 BPM | OXYGEN SATURATION: 99 % | TEMPERATURE: 97.4 F | SYSTOLIC BLOOD PRESSURE: 112 MMHG | BODY MASS INDEX: 26.3 KG/M2 | WEIGHT: 173 LBS

## 2025-08-27 DIAGNOSIS — I10 BENIGN ESSENTIAL HTN: ICD-10-CM

## 2025-08-27 DIAGNOSIS — I25.10 CORONARY ARTERY DISEASE INVOLVING NATIVE CORONARY ARTERY OF NATIVE HEART WITHOUT ANGINA PECTORIS: ICD-10-CM

## 2025-08-27 DIAGNOSIS — I87.2 VENOUS INSUFFICIENCY OF BOTH LOWER EXTREMITIES: ICD-10-CM

## 2025-08-27 DIAGNOSIS — E11.22 TYPE 2 DIABETES MELLITUS WITH STAGE 3A CHRONIC KIDNEY DISEASE, WITHOUT LONG-TERM CURRENT USE OF INSULIN (HCC): ICD-10-CM

## 2025-08-27 DIAGNOSIS — N18.31 TYPE 2 DIABETES MELLITUS WITH STAGE 3A CHRONIC KIDNEY DISEASE, WITHOUT LONG-TERM CURRENT USE OF INSULIN (HCC): ICD-10-CM

## 2025-08-27 DIAGNOSIS — R33.9 URINARY RETENTION: ICD-10-CM

## 2025-08-27 DIAGNOSIS — E11.40 TYPE 2 DIABETES MELLITUS WITH DIABETIC NEUROPATHY, WITHOUT LONG-TERM CURRENT USE OF INSULIN (HCC): Primary | ICD-10-CM

## 2025-08-27 DIAGNOSIS — Z12.5 SCREENING FOR PROSTATE CANCER: ICD-10-CM

## 2025-08-27 DIAGNOSIS — I50.22 CHRONIC SYSTOLIC CHF (CONGESTIVE HEART FAILURE) (HCC): ICD-10-CM

## 2025-08-27 DIAGNOSIS — E53.8 B12 DEFICIENCY: ICD-10-CM

## 2025-08-27 DIAGNOSIS — E78.5 HYPERLIPIDEMIA, UNSPECIFIED HYPERLIPIDEMIA TYPE: ICD-10-CM

## 2025-08-27 DIAGNOSIS — I73.9 PVD (PERIPHERAL VASCULAR DISEASE): ICD-10-CM

## 2025-08-27 DIAGNOSIS — E11.59 TYPE 2 DIABETES MELLITUS WITH VASCULAR DISEASE (HCC): ICD-10-CM

## 2025-08-27 DIAGNOSIS — I65.23 ASYMPTOMATIC BILATERAL CAROTID ARTERY STENOSIS: ICD-10-CM

## 2025-08-27 DIAGNOSIS — N13.30 HYDRONEPHROSIS, RIGHT: ICD-10-CM

## 2025-08-27 LAB
A/G RATIO: 1.6 (ref 1–2.1)
ALBUMIN: 3.9 G/DL (ref 3.5–5)
ALP BLD-CCNC: 98 U/L (ref 33–140)
ALT SERPL-CCNC: 13 U/L (ref 0–60)
ANION GAP SERPL CALCULATED.3IONS-SCNC: 9 MMOL/L (ref 5–13)
AST SERPL-CCNC: 21 U/L (ref 0–40)
BASOPHILS ABSOLUTE: 0 10*3/UL (ref 0–0.2)
BASOPHILS RELATIVE PERCENT: 0.3 %
BILIRUB SERPL-MCNC: 0.4 MG/DL (ref 0.2–1.2)
BUN / CREAT RATIO: 18
BUN BLDV-MCNC: 20 MG/DL (ref 7–25)
CALCIUM SERPL-MCNC: 10 MG/DL (ref 8.5–10.5)
CHLORIDE BLD-SCNC: 107 MMOL/L (ref 98–110)
CHOLESTEROL, TOTAL: 120 MG/DL (ref 125–199)
CO2: 22 MMOL/L (ref 22–29)
CREAT SERPL-MCNC: 1.1 MG/DL (ref 0.5–1.3)
EGFR (CKD-EPI): 68 SEE NOTE
EOSINOPHILS ABSOLUTE: 0.1 10*3/UL (ref 0–0.5)
EOSINOPHILS RELATIVE PERCENT: 2.2 %
ESTIMATED AVERAGE GLUCOSE: 160 MG/DL (ref 68–114)
GLOBULIN: 2.4 G/DL (ref 2–3.7)
GLUCOSE BLD-MCNC: 145 MG/DL (ref 71–99)
GRANULOCYTE ABSOLUTE COUNT: 3.7 10*3/UL (ref 1.5–7.8)
HBA1C MFR BLD: 7.2 % (ref 4–5.6)
HCT VFR BLD CALC: 41.5 % (ref 40–51)
HDLC SERPL-MCNC: 55 MG/DL (ref 40–180)
HEMOGLOBIN: 12.6 G/DL (ref 13.2–17.1)
IMMATURE GRANULOCYTES %: 0 10*3/UL (ref 0–0.1)
IMMATURE GRANULOCYTES %: 0.3 %
LDL CHOLESTEROL: 56 MG/DL (ref 0–100)
LEUKOCYTES, BLD: 6 10*3/UL (ref 4–12)
LYMPHOCYTES ABSOLUTE: 1.5 10*3/UL (ref 0.8–3.9)
LYMPHOCYTES RELATIVE PERCENT: 25.4 %
MCH RBC QN AUTO: 29.6 PG (ref 27–33)
MCHC RBC AUTO-ENTMCNC: 30.4 G/DL (ref 30–36)
MCV RBC AUTO: 97.6 FL (ref 80–100)
MONOCYTES ABSOLUTE: 0.6 10*3/UL (ref 0.2–0.9)
MONOCYTES RELATIVE PERCENT: 9.8 %
NEUTROPHILS RELATIVE PERCENT: 62 %
NONHDLC SERPL-MCNC: 65 MG/DL (ref 0–129)
PDW BLD-RTO: 11.9 % (ref 11–15)
PLATELET # BLD: 226 10*3/UL (ref 140–400)
PMV BLD AUTO: 9.9 FL (ref 9–13)
POTASSIUM SERPL-SCNC: 5.3 MMOL/L (ref 3.5–5.1)
PROTEIN FLUID: 6.3 G/DL (ref 6–8)
RBC # BLD: 4.25 10*6/UL (ref 4.2–5.8)
SODIUM BLD-SCNC: 138 MMOL/L (ref 135–146)
TRIGL SERPL-MCNC: 48 MG/DL (ref 0–149)
VITAMIN B-12: 1127 PG/ML (ref 213–816)